# Patient Record
Sex: MALE | Race: BLACK OR AFRICAN AMERICAN | NOT HISPANIC OR LATINO | Employment: FULL TIME | URBAN - METROPOLITAN AREA
[De-identification: names, ages, dates, MRNs, and addresses within clinical notes are randomized per-mention and may not be internally consistent; named-entity substitution may affect disease eponyms.]

---

## 2023-10-11 PROBLEM — I47.10 SUPRAVENTRICULAR TACHYCARDIA (CMS-HCC): Status: ACTIVE | Noted: 2023-10-11

## 2023-10-11 PROBLEM — K64.8 BLEEDING INTERNAL HEMORRHOIDS: Status: ACTIVE | Noted: 2023-10-11

## 2023-10-11 PROBLEM — M54.16 LEFT LUMBAR RADICULOPATHY: Status: ACTIVE | Noted: 2023-10-11

## 2023-10-11 PROBLEM — L81.4 OTHER MELANIN HYPERPIGMENTATION: Status: ACTIVE | Noted: 2019-09-12

## 2023-10-11 PROBLEM — G47.30 SLEEP APNEA: Status: ACTIVE | Noted: 2023-10-11

## 2023-10-11 PROBLEM — E66.3 OVERWEIGHT: Status: ACTIVE | Noted: 2023-10-11

## 2023-10-11 PROBLEM — K64.9 HEMORRHOIDS: Status: ACTIVE | Noted: 2023-10-11

## 2023-10-11 PROBLEM — C92.10 CHRONIC MYELOID LEUKEMIA (MULTI): Status: ACTIVE | Noted: 2023-10-11

## 2023-10-11 PROBLEM — M51.26 HERNIATED NUCLEUS PULPOSUS, L4-5: Status: ACTIVE | Noted: 2023-10-11

## 2023-10-11 PROBLEM — B95.1 GROUP BETA STREP POSITIVE: Status: ACTIVE | Noted: 2023-10-11

## 2023-10-11 PROBLEM — K64.8 PROLAPSED INTERNAL HEMORRHOIDS: Status: ACTIVE | Noted: 2023-10-11

## 2023-10-11 PROBLEM — R94.31 ABNORMAL ECG: Status: ACTIVE | Noted: 2023-10-11

## 2023-10-11 PROBLEM — G47.33 OBSTRUCTIVE SLEEP APNEA OF ADULT: Status: ACTIVE | Noted: 2023-10-11

## 2023-10-11 PROBLEM — R00.2 PALPITATION: Status: ACTIVE | Noted: 2023-10-11

## 2023-10-11 PROBLEM — K21.9 ACID REFLUX: Status: ACTIVE | Noted: 2023-10-11

## 2023-10-11 PROBLEM — D22.5 MELANOCYTIC NEVI OF TRUNK: Status: ACTIVE | Noted: 2019-09-12

## 2023-10-11 PROBLEM — K62.5 RECTAL HEMORRHAGE: Status: ACTIVE | Noted: 2023-10-11

## 2023-10-11 PROBLEM — R06.02 SHORTNESS OF BREATH: Status: ACTIVE | Noted: 2023-10-11

## 2023-10-11 RX ORDER — ESOMEPRAZOLE MAGNESIUM 40 MG/1
1 CAPSULE, DELAYED RELEASE ORAL DAILY
COMMUNITY
Start: 2022-08-02 | End: 2023-10-23 | Stop reason: SDUPTHER

## 2023-10-11 RX ORDER — SODIUM PICOSULFATE, MAGNESIUM OXIDE, AND ANHYDROUS CITRIC ACID 10; 3.5; 12 MG/160ML; G/160ML; G/160ML
LIQUID ORAL
COMMUNITY
Start: 2022-06-17 | End: 2023-12-19 | Stop reason: WASHOUT

## 2023-10-11 RX ORDER — IMATINIB MESYLATE 400 MG/1
1 TABLET, FILM COATED ORAL DAILY
COMMUNITY

## 2023-10-12 ENCOUNTER — LAB (OUTPATIENT)
Dept: LAB | Facility: LAB | Age: 48
End: 2023-10-12
Payer: COMMERCIAL

## 2023-10-12 ENCOUNTER — OFFICE VISIT (OUTPATIENT)
Dept: PRIMARY CARE | Facility: CLINIC | Age: 48
End: 2023-10-12
Payer: COMMERCIAL

## 2023-10-12 VITALS
BODY MASS INDEX: 34.29 KG/M2 | RESPIRATION RATE: 18 BRPM | HEART RATE: 98 BPM | TEMPERATURE: 97.2 F | DIASTOLIC BLOOD PRESSURE: 82 MMHG | SYSTOLIC BLOOD PRESSURE: 132 MMHG | WEIGHT: 267.2 LBS | HEIGHT: 74 IN

## 2023-10-12 DIAGNOSIS — R00.2 PALPITATION: ICD-10-CM

## 2023-10-12 DIAGNOSIS — Z12.5 PROSTATE CANCER SCREENING: ICD-10-CM

## 2023-10-12 DIAGNOSIS — Z12.5 PROSTATE CANCER SCREENING: Primary | ICD-10-CM

## 2023-10-12 LAB
CHOLEST SERPL-MCNC: 123 MG/DL (ref 0–199)
CHOLESTEROL/HDL RATIO: 2.8
HDLC SERPL-MCNC: 43.5 MG/DL
LDLC SERPL CALC-MCNC: 66 MG/DL
NON HDL CHOLESTEROL: 80 MG/DL (ref 0–149)
PSA SERPL-MCNC: 1.23 NG/ML
TRIGL SERPL-MCNC: 70 MG/DL (ref 0–149)
TSH SERPL-ACNC: 1.15 MIU/L (ref 0.44–3.98)
VLDL: 14 MG/DL (ref 0–40)

## 2023-10-12 PROCEDURE — 80061 LIPID PANEL: CPT

## 2023-10-12 PROCEDURE — 90686 IIV4 VACC NO PRSV 0.5 ML IM: CPT | Performed by: INTERNAL MEDICINE

## 2023-10-12 PROCEDURE — 90677 PCV20 VACCINE IM: CPT | Performed by: INTERNAL MEDICINE

## 2023-10-12 PROCEDURE — 90472 IMMUNIZATION ADMIN EACH ADD: CPT | Performed by: INTERNAL MEDICINE

## 2023-10-12 PROCEDURE — 84443 ASSAY THYROID STIM HORMONE: CPT

## 2023-10-12 PROCEDURE — 90471 IMMUNIZATION ADMIN: CPT | Performed by: INTERNAL MEDICINE

## 2023-10-12 PROCEDURE — 99396 PREV VISIT EST AGE 40-64: CPT | Performed by: INTERNAL MEDICINE

## 2023-10-12 PROCEDURE — 36415 COLL VENOUS BLD VENIPUNCTURE: CPT

## 2023-10-12 PROCEDURE — 1036F TOBACCO NON-USER: CPT | Performed by: INTERNAL MEDICINE

## 2023-10-12 PROCEDURE — 84153 ASSAY OF PSA TOTAL: CPT

## 2023-10-12 RX ORDER — FERROUS GLUCONATE 325 MG
38 TABLET ORAL
COMMUNITY

## 2023-10-12 ASSESSMENT — ENCOUNTER SYMPTOMS
LOSS OF SENSATION IN FEET: 0
OCCASIONAL FEELINGS OF UNSTEADINESS: 0
DEPRESSION: 0

## 2023-10-12 ASSESSMENT — PATIENT HEALTH QUESTIONNAIRE - PHQ9
1. LITTLE INTEREST OR PLEASURE IN DOING THINGS: NOT AT ALL
2. FEELING DOWN, DEPRESSED OR HOPELESS: NOT AT ALL
SUM OF ALL RESPONSES TO PHQ9 QUESTIONS 1 AND 2: 0

## 2023-10-12 NOTE — PROGRESS NOTES
"Subjective   Patient ID: Duncan Ackerman is a 48 y.o. male who presents for Annual Exam.    HPI 48-year-old male here for follow-up annual visit.  Patient is doing well with his CML recently saw hematology is on Gleevac for 13 years    Review of Systems  HEENT negative  Cardiovascular 1 episode of chest tightness left-sided came and went well resting not exertional, also intermittent palpitations had these a year or 2 ago with a work-up apparently an echocardiogram showed left atrial enlargement.  Respiratory no shortness of breath on chronic CPAP for sleep apnea  GI chronic rectal bleeding from hemorrhoids had banding twice once he almost passed out interested in something more definitive   negative  Musculoskeletal negative    Objective   /82 (BP Location: Right arm, Patient Position: Sitting)   Pulse 98   Temp 36.2 °C (97.2 °F)   Resp 18   Ht 1.88 m (6' 2\")   Wt 121 kg (267 lb 3.2 oz)   BMI 34.31 kg/m²     Physical Exam  Constitutional:       Appearance: Normal appearance.   HENT:      Right Ear: Tympanic membrane normal.   Eyes:      Conjunctiva/sclera: Conjunctivae normal.   Neck:      Thyroid: No thyroid mass, thyromegaly or thyroid tenderness.      Vascular: No carotid bruit.   Cardiovascular:      Rate and Rhythm: Normal rate. Rhythm irregular.      Heart sounds: No murmur heard.     No friction rub. No gallop.   Pulmonary:      Effort: Pulmonary effort is normal.      Breath sounds: Normal breath sounds.   Abdominal:      General: Abdomen is flat. Bowel sounds are normal.      Palpations: Abdomen is soft. There is no mass.      Tenderness: There is no abdominal tenderness. There is no guarding.   Genitourinary:     Penis: Normal.       Testes: Normal.      Prostate: Normal.      Rectum: Normal. Guaiac result negative.      Comments: External hemorrhoids  Musculoskeletal:         General: Normal range of motion.      Cervical back: No rigidity.   Skin:     General: Skin is warm. "   Neurological:      General: No focal deficit present.      Mental Status: He is alert.   Psychiatric:         Mood and Affect: Mood normal.       EKG nsr  Assessment/Plan       #1  Cardiac I like to begin the work-up with a coronary artery calcium score and also a Zio patch TSH lipid panel.  He may need a stress test we will decide based on the results of these #2 acid reflux restart Nexium he had been off of it  CML stable Ari hematology no patient is due for immunizations we will do flu and Prevnar 20 today he should get COVID booster in the drugstore he will follow-up with me in 1 month and we will start the Shingrix series.  He would also be a candidate for RSV down the road.  3.  Hemorrhoids will refer to colorectal Dr. Toshia Reddy

## 2023-10-23 DIAGNOSIS — K21.9 GASTROESOPHAGEAL REFLUX DISEASE, UNSPECIFIED WHETHER ESOPHAGITIS PRESENT: Primary | ICD-10-CM

## 2023-10-24 RX ORDER — ESOMEPRAZOLE MAGNESIUM 40 MG/1
40 CAPSULE, DELAYED RELEASE ORAL DAILY
Qty: 90 CAPSULE | Refills: 3 | Status: SHIPPED | OUTPATIENT
Start: 2023-10-24

## 2023-10-26 ENCOUNTER — ANCILLARY PROCEDURE (OUTPATIENT)
Dept: RADIOLOGY | Facility: CLINIC | Age: 48
End: 2023-10-26
Payer: COMMERCIAL

## 2023-10-26 DIAGNOSIS — R00.2 PALPITATION: ICD-10-CM

## 2023-10-26 PROCEDURE — 75571 CT HRT W/O DYE W/CA TEST: CPT

## 2023-10-30 DIAGNOSIS — M54.16 LEFT LUMBAR RADICULOPATHY: Primary | ICD-10-CM

## 2023-10-30 DIAGNOSIS — R00.2 PALPITATION: ICD-10-CM

## 2023-10-30 NOTE — RESULT ENCOUNTER NOTE
Patient notified his CAT scan looks great he has a benign nodule that is been there before it is not changed nothing else needs to be done at this point.  He feels like he is having some pain in his kidneys in his back area I will do a urinalysis also we will set up a Holter monitor

## 2023-11-01 ENCOUNTER — LAB (OUTPATIENT)
Dept: LAB | Facility: LAB | Age: 48
End: 2023-11-01
Payer: COMMERCIAL

## 2023-11-01 DIAGNOSIS — M54.16 LEFT LUMBAR RADICULOPATHY: ICD-10-CM

## 2023-11-01 PROCEDURE — 81001 URINALYSIS AUTO W/SCOPE: CPT

## 2023-11-02 LAB
APPEARANCE UR: CLEAR
BILIRUB UR STRIP.AUTO-MCNC: NEGATIVE MG/DL
COLOR UR: YELLOW
GLUCOSE UR STRIP.AUTO-MCNC: NEGATIVE MG/DL
HOLD SPECIMEN: NORMAL
KETONES UR STRIP.AUTO-MCNC: NEGATIVE MG/DL
LEUKOCYTE ESTERASE UR QL STRIP.AUTO: NEGATIVE
MUCOUS THREADS #/AREA URNS AUTO: NORMAL /LPF
NITRITE UR QL STRIP.AUTO: NEGATIVE
PH UR STRIP.AUTO: 6 [PH]
PROT UR STRIP.AUTO-MCNC: ABNORMAL MG/DL
RBC # UR STRIP.AUTO: NEGATIVE /UL
RBC #/AREA URNS AUTO: NORMAL /HPF
SP GR UR STRIP.AUTO: 1.02
UROBILINOGEN UR STRIP.AUTO-MCNC: <2 MG/DL
WBC #/AREA URNS AUTO: NORMAL /HPF

## 2023-11-09 ENCOUNTER — HOSPITAL ENCOUNTER (OUTPATIENT)
Dept: CARDIOLOGY | Facility: CLINIC | Age: 48
Discharge: HOME | End: 2023-11-09
Payer: COMMERCIAL

## 2023-11-09 DIAGNOSIS — R00.2 PALPITATION: ICD-10-CM

## 2023-11-09 PROCEDURE — 93248 EXT ECG>7D<15D REV&INTERPJ: CPT | Performed by: INTERNAL MEDICINE

## 2023-11-09 PROCEDURE — 93246 EXT ECG>7D<15D RECORDING: CPT

## 2023-11-13 ENCOUNTER — APPOINTMENT (OUTPATIENT)
Dept: PRIMARY CARE | Facility: CLINIC | Age: 48
End: 2023-11-13
Payer: COMMERCIAL

## 2023-12-18 ENCOUNTER — OFFICE VISIT (OUTPATIENT)
Dept: SURGERY | Facility: CLINIC | Age: 48
End: 2023-12-18
Payer: COMMERCIAL

## 2023-12-18 ENCOUNTER — APPOINTMENT (OUTPATIENT)
Dept: SURGERY | Facility: CLINIC | Age: 48
End: 2023-12-18
Payer: COMMERCIAL

## 2023-12-18 VITALS
WEIGHT: 263.2 LBS | SYSTOLIC BLOOD PRESSURE: 158 MMHG | HEIGHT: 74 IN | HEART RATE: 81 BPM | TEMPERATURE: 97.7 F | DIASTOLIC BLOOD PRESSURE: 54 MMHG | BODY MASS INDEX: 33.78 KG/M2

## 2023-12-18 DIAGNOSIS — K64.8 PROLAPSED INTERNAL HEMORRHOIDS: Primary | ICD-10-CM

## 2023-12-18 PROCEDURE — 46600 DIAGNOSTIC ANOSCOPY SPX: CPT | Performed by: NURSE PRACTITIONER

## 2023-12-18 PROCEDURE — 99213 OFFICE O/P EST LOW 20 MIN: CPT | Performed by: NURSE PRACTITIONER

## 2023-12-18 PROCEDURE — 1036F TOBACCO NON-USER: CPT | Performed by: NURSE PRACTITIONER

## 2023-12-18 ASSESSMENT — PAIN SCALES - GENERAL: PAINLEVEL: 0-NO PAIN

## 2023-12-18 NOTE — PROGRESS NOTES
Subjective   Patient ID: Duncan Ackerman is a 48 y.o. male who presents for Hemorrhoids.    HPI  He has had the hemorrhoids for years and will have rectal bleeding and pain that comes and goes.  He had a rubber band ligation in the past and that helped only for a little bit.  2 weeks ago he had more rectal bleeding.  He will have prolapsing internal hemorrhoids that will prolapse out after the BM and it can take hours for them to go back in.  He will have anal pain that can last for 15-20 minutes after the BM's.      He will have 1 soft BM every day with no straining.  He will sit over 5-10 minutes on the toilet to have a BM.  He is not taking any fiber supplements or Colace.    No hx of any perianal surgeries.  He has had a rubber band ligation in 2020 and last year.    Colonoscopy a few years ago    Review of Systems   All other systems reviewed and are negative.      Objective   Physical Exam  Constitutional:       Appearance: Normal appearance.   HENT:      Head: Normocephalic and atraumatic.   Pulmonary:      Effort: Pulmonary effort is normal.   Musculoskeletal:         General: Normal range of motion.   Skin:     General: Skin is warm and dry.   Neurological:      General: No focal deficit present.      Mental Status: He is alert and oriented to person, place, and time.   Psychiatric:         Mood and Affect: Mood normal.         Behavior: Behavior normal.       Anoscopy    Date/Time: 12/19/2023 3:38 PM    Performed by: ANNA Rubio  Authorized by: ANNA Rubio    Consent:     Consent obtained:  Verbal    Consent given by:  Patient    Risks, benefits, and alternatives were discussed: yes    Universal protocol:     Procedure explained and questions answered to patient or proxy's satisfaction: yes      Patient identity confirmed:  Verbally with patient  Post-procedure details:     Procedure completion:  Tolerated  Comments:      No external hemorrhoid.  Goo tone on GAVIN.  On  anoscopy, he has moderate to large prolapsing and irritated internal hemorrhoids.  No active bleeding    Assessment/Plan   Duncan has moderate to large prolapsing inflamed internal hemorrhoids.  We talked about surgery for the hemorrhoids and he would like to proceed.  He will call when he would like to have this done and I will schedule him to have the hemorrhoidectomy with one of our colorectal surgeons.      Thi Larkin, STU-DONNIE 12/18/23 11:13 AM

## 2024-01-24 ENCOUNTER — LAB (OUTPATIENT)
Dept: LAB | Facility: LAB | Age: 49
End: 2024-01-24
Payer: COMMERCIAL

## 2024-01-24 ENCOUNTER — OFFICE VISIT (OUTPATIENT)
Dept: PRIMARY CARE | Facility: CLINIC | Age: 49
End: 2024-01-24
Payer: COMMERCIAL

## 2024-01-24 VITALS
RESPIRATION RATE: 18 BRPM | HEART RATE: 80 BPM | BODY MASS INDEX: 34.01 KG/M2 | HEIGHT: 74 IN | SYSTOLIC BLOOD PRESSURE: 132 MMHG | DIASTOLIC BLOOD PRESSURE: 82 MMHG | OXYGEN SATURATION: 98 % | TEMPERATURE: 97.7 F | WEIGHT: 265 LBS

## 2024-01-24 DIAGNOSIS — R73.9 HYPERGLYCEMIA: Primary | ICD-10-CM

## 2024-01-24 DIAGNOSIS — N52.9 ERECTILE DYSFUNCTION, UNSPECIFIED ERECTILE DYSFUNCTION TYPE: ICD-10-CM

## 2024-01-24 DIAGNOSIS — R00.2 PALPITATION: ICD-10-CM

## 2024-01-24 DIAGNOSIS — R73.9 HYPERGLYCEMIA: ICD-10-CM

## 2024-01-24 LAB
EST. AVERAGE GLUCOSE BLD GHB EST-MCNC: 105 MG/DL
HBA1C MFR BLD: 5.3 %
MAGNESIUM SERPL-MCNC: 2.07 MG/DL (ref 1.6–2.4)

## 2024-01-24 PROCEDURE — 83735 ASSAY OF MAGNESIUM: CPT

## 2024-01-24 PROCEDURE — 84402 ASSAY OF FREE TESTOSTERONE: CPT

## 2024-01-24 PROCEDURE — 1036F TOBACCO NON-USER: CPT | Performed by: INTERNAL MEDICINE

## 2024-01-24 PROCEDURE — 99214 OFFICE O/P EST MOD 30 MIN: CPT | Performed by: INTERNAL MEDICINE

## 2024-01-24 PROCEDURE — 36415 COLL VENOUS BLD VENIPUNCTURE: CPT

## 2024-01-24 PROCEDURE — 83036 HEMOGLOBIN GLYCOSYLATED A1C: CPT

## 2024-01-24 RX ORDER — METOPROLOL SUCCINATE 25 MG/1
25 TABLET, EXTENDED RELEASE ORAL DAILY
Qty: 30 TABLET | Refills: 5 | Status: SHIPPED | OUTPATIENT
Start: 2024-01-24 | End: 2024-07-22

## 2024-01-24 ASSESSMENT — PATIENT HEALTH QUESTIONNAIRE - PHQ9
1. LITTLE INTEREST OR PLEASURE IN DOING THINGS: NOT AT ALL
SUM OF ALL RESPONSES TO PHQ9 QUESTIONS 1 AND 2: 0
2. FEELING DOWN, DEPRESSED OR HOPELESS: NOT AT ALL

## 2024-01-24 NOTE — PROGRESS NOTES
"Subjective   Patient ID: Duncan Ackerman is a 48 y.o. male who presents for Follow-up.    HPI patient is here for follow-up palpitations he is ambulatory monitor showed short runs of V. tach longer runs of SVT multiple PVCs and premature atrial contractions.  These do correlate with his block.  His coronary artery calcium score 0 his total cholesterol 123    Review of Systems  Cardiovascular still has palpitations come and go bothersome would like some help  Respiratory negative   he has ED but mail-order Cialis tried it helps him 20 mg and 60 mg dose.  Endo concern glucose a little bit high blood work constantly he will need an A1c this is usually done nonfasting    Objective   /82 (BP Location: Right arm, Patient Position: Sitting)   Pulse 80   Temp 36.5 °C (97.7 °F)   Resp 18   Ht 1.88 m (6' 2\")   Wt 120 kg (265 lb)   SpO2 98%   BMI 34.02 kg/m²     Physical Exam  Neck:      Vascular: No carotid bruit.   Cardiovascular:      Rate and Rhythm: Regular rhythm.      Heart sounds: Normal heart sounds.   Pulmonary:      Breath sounds: Normal breath sounds.   Abdominal:      General: Abdomen is flat. Bowel sounds are normal.      Palpations: Abdomen is soft.   Musculoskeletal:      Cervical back: No tenderness.   Lymphadenopathy:      Cervical: No cervical adenopathy.       Seen with his wife      Assessment/Plan   Diagnoses and all orders for this visit:  Hyperglycemia  -     Hemoglobin A1C; Future  Palpitation  We went over what I see on his ambulatory monitor in fact I made a copy and gave it to them.  Because he is on Nexium I will check a magnesium to make sure this is not low contributing I will add low-dose metoprolol XL 25 mg once daily his blood pressure is a little bit high today.  Follow-up 1 month  -     Magnesium; Future  -     metoprolol succinate XL (Toprol-XL) 25 mg 24 hr tablet; Take 1 tablet (25 mg) by mouth once daily. Do not crush or chew.  Erectile dysfunction, unspecified erectile " dysfunction type  -     Testosterone, total and free; Future  Okay to continue with his mail order testosterone which I believe is sildenafil we will do a workup

## 2024-01-28 LAB
TESTOSTERONE FREE (CHAN): 100.6 PG/ML (ref 35–155)
TESTOSTERONE,TOTAL,LC-MS/MS: 470 NG/DL (ref 250–1100)

## 2024-02-23 ENCOUNTER — APPOINTMENT (OUTPATIENT)
Dept: PRIMARY CARE | Facility: CLINIC | Age: 49
End: 2024-02-23
Payer: COMMERCIAL

## 2024-02-29 ENCOUNTER — OFFICE VISIT (OUTPATIENT)
Dept: PRIMARY CARE | Facility: CLINIC | Age: 49
End: 2024-02-29
Payer: COMMERCIAL

## 2024-02-29 ENCOUNTER — LAB (OUTPATIENT)
Dept: LAB | Facility: LAB | Age: 49
End: 2024-02-29
Payer: COMMERCIAL

## 2024-02-29 VITALS
OXYGEN SATURATION: 98 % | RESPIRATION RATE: 18 BRPM | BODY MASS INDEX: 33.88 KG/M2 | TEMPERATURE: 97.2 F | SYSTOLIC BLOOD PRESSURE: 124 MMHG | HEART RATE: 80 BPM | HEIGHT: 74 IN | WEIGHT: 264 LBS | DIASTOLIC BLOOD PRESSURE: 80 MMHG

## 2024-02-29 DIAGNOSIS — R30.0 DYSURIA: ICD-10-CM

## 2024-02-29 DIAGNOSIS — K64.1 GRADE II HEMORRHOIDS: ICD-10-CM

## 2024-02-29 DIAGNOSIS — K21.9 GASTROESOPHAGEAL REFLUX DISEASE WITHOUT ESOPHAGITIS: ICD-10-CM

## 2024-02-29 DIAGNOSIS — R00.2 PALPITATION: ICD-10-CM

## 2024-02-29 DIAGNOSIS — K64.9 BLEEDING HEMORRHOIDS: Primary | ICD-10-CM

## 2024-02-29 PROBLEM — N52.9 ERECTILE DYSFUNCTION: Status: ACTIVE | Noted: 2024-02-29

## 2024-02-29 PROBLEM — R05.8 PRODUCTIVE COUGH: Status: ACTIVE | Noted: 2024-02-29

## 2024-02-29 PROBLEM — C92.11: Status: ACTIVE | Noted: 2021-12-03

## 2024-02-29 PROBLEM — R58 BLEEDING: Status: ACTIVE | Noted: 2024-02-29

## 2024-02-29 PROBLEM — M51.26 HERNIATION OF LUMBAR INTERVERTEBRAL DISC WITHOUT MYELOPATHY: Status: ACTIVE | Noted: 2023-10-11

## 2024-02-29 PROBLEM — M54.16 LUMBAR RADICULOPATHY: Status: ACTIVE | Noted: 2023-10-11

## 2024-02-29 PROBLEM — D22.5 MELANOCYTIC NEVUS OF TRUNK: Status: ACTIVE | Noted: 2019-09-12

## 2024-02-29 PROBLEM — R73.9 HYPERGLYCEMIA: Status: ACTIVE | Noted: 2024-02-29

## 2024-02-29 PROBLEM — G47.33 OBSTRUCTIVE SLEEP APNEA SYNDROME IN ADULT: Status: ACTIVE | Noted: 2023-10-11

## 2024-02-29 PROBLEM — R82.998 DARK URINE: Status: ACTIVE | Noted: 2024-02-29

## 2024-02-29 PROBLEM — J18.9 ATYPICAL PNEUMONIA: Status: ACTIVE | Noted: 2024-02-29

## 2024-02-29 PROBLEM — R09.81 NASAL CONGESTION: Status: ACTIVE | Noted: 2024-02-29

## 2024-02-29 PROBLEM — L81.9 DISORDER OF PIGMENTATION: Status: ACTIVE | Noted: 2019-09-12

## 2024-02-29 PROBLEM — J02.9 SORE THROAT: Status: ACTIVE | Noted: 2024-02-29

## 2024-02-29 PROCEDURE — 87086 URINE CULTURE/COLONY COUNT: CPT

## 2024-02-29 PROCEDURE — 99214 OFFICE O/P EST MOD 30 MIN: CPT | Performed by: INTERNAL MEDICINE

## 2024-02-29 PROCEDURE — 1036F TOBACCO NON-USER: CPT | Performed by: INTERNAL MEDICINE

## 2024-02-29 RX ORDER — HYDROCORTISONE ACETATE 25 MG/1
25 SUPPOSITORY RECTAL NIGHTLY PRN
Qty: 30 SUPPOSITORY | Refills: 1 | Status: SHIPPED | OUTPATIENT
Start: 2024-02-29 | End: 2024-03-30

## 2024-02-29 RX ORDER — HYDROCORTISONE 25 MG/G
CREAM TOPICAL 4 TIMES DAILY PRN
Qty: 30 G | Refills: 0 | Status: SHIPPED | OUTPATIENT
Start: 2024-02-29 | End: 2025-02-28

## 2024-02-29 NOTE — PROGRESS NOTES
Subjective   Patient ID: Duncan Ackerman is a 48 y.o. male who presents for Annual Exam.    HPI 47 yo aam for checkup  High blood pressure now on metoprolol acid reflux now on Nexium and internal/external hemorrhoids    Review of Systems  Patient's palpitations are better still occasional  Acid reflux better  Hemorrhoids better patient is following instructions from colorectal not sitting for long on the toilet he is not using anything topically occasionally has pain in the rectum occasionally the hemorrhoids are out  Objective   There were no vitals taken for this visit.    Physical Exam  Cardiovascular:      Rate and Rhythm: Regular rhythm.   Abdominal:      General: There is no distension.      Tenderness: There is no abdominal tenderness.   Genitourinary:     Comments: Rectum very minimal external hemorrhoids involves nontender  Neurological:      Mental Status: He is alert.         Assessment/Plan   Diagnoses and all orders for this visit:  Bleeding hemorrhoids  Continue with more fiber try to avoid nuts and corn popcorn things that come up undigested and irritate that area continue to not sit on the toilet for more than 5 minutes.  Try to wash or take a shower after bowel movement.  Use hydrocortisone rectal suppository if you are having a lot of rectal pain during bedtime.  During the day you can use the Anusol hydrocortisone cream but again use these very sparingly and only when it is hurting and you can get them better otherwise  -     hydrocortisone (Anusol-HC) 25 mg suppository; Insert 1 suppository (25 mg) into the rectum as needed at bedtime for hemorrhoids.  -     hydrocortisone (Anusol-HC) 2.5 % rectal cream; Insert into the rectum 4 times a day as needed for hemorrhoids (rectal discomfort). Apply to affected areas  Dysuria  -     Urine Culture; Future  Hypertension good control continue with metoprolol XL 25 mg daily  Palpitations better control continue with metoprolol  Acid reflux continue with  Nexium  Follow-up 4 months

## 2024-03-01 LAB — BACTERIA UR CULT: NO GROWTH

## 2024-06-27 ENCOUNTER — APPOINTMENT (OUTPATIENT)
Dept: PRIMARY CARE | Facility: CLINIC | Age: 49
End: 2024-06-27
Payer: COMMERCIAL

## 2024-06-27 DIAGNOSIS — K64.8 BLEEDING INTERNAL HEMORRHOIDS: ICD-10-CM

## 2024-06-27 DIAGNOSIS — C92.10 CHRONIC MYELOID LEUKEMIA (MULTI): ICD-10-CM

## 2024-06-27 DIAGNOSIS — R00.2 PALPITATION: ICD-10-CM

## 2024-06-27 DIAGNOSIS — C92.11: ICD-10-CM

## 2024-06-27 DIAGNOSIS — L30.9 ECZEMA, UNSPECIFIED TYPE: Primary | ICD-10-CM

## 2024-06-27 DIAGNOSIS — K21.9 GASTROESOPHAGEAL REFLUX DISEASE WITHOUT ESOPHAGITIS: ICD-10-CM

## 2024-06-27 DIAGNOSIS — K64.8 PROLAPSED INTERNAL HEMORRHOIDS: ICD-10-CM

## 2024-06-27 PROCEDURE — 99214 OFFICE O/P EST MOD 30 MIN: CPT | Performed by: INTERNAL MEDICINE

## 2024-06-27 RX ORDER — CLOBETASOL PROPIONATE 0.5 MG/G
CREAM TOPICAL 2 TIMES DAILY
Qty: 30 G | Refills: 1 | Status: SHIPPED | OUTPATIENT
Start: 2024-06-27 | End: 2025-02-22

## 2024-06-27 NOTE — PROGRESS NOTES
Subjective   Patient ID: Duncan Ackerman is a 49 y.o. male who presents for No chief complaint on file..    HPI check up and follow-up palpitations better on metoprolol XL 25 mg daily, CML, bleeding external hemorrhoids    Review of Systems  Cardiovascular palpitations better  GI patient is decided to have surgery on hemorrhoids he has a follow-up with the physician has been in contact with them  CML seeing his hematologist to transfer from [facility]  Objective   There were no vitals taken for this visit.    Physical Exam blood pressure 120/75 heart rate 65 and regular respirations 16  Lungs clear to auscultation  Heart regular rate and rhythm without gallop rub or murmur  Abdomen negative  Extremities no edema  You have some eczema just inside your ears and probably on the left wrist although the left wrist may be contact dermatitis from your watchband    Assessment/Plan   Diagnoses and all orders for this visit:  Eczema, unspecified type  -     clobetasol (Temovate) 0.05 % cream; Apply topically 2 times a day.  Gastroesophageal reflux disease without esophagitis good control continue with your antacid  Chronic myeloid leukemia (Multi)  I reviewed your blood work good control  Palpitation  This is under good control with metoprolol XL 25 mg once daily BCR/ABL1-positive chronic myeloid leukemia (CML) in remission (Multi)  Bleeding internal hemorrhoids  You are working with surgical team to have hemorrhoidectomy call me after you meet with the doctor  Prolapsed internal hemorrhoids  Follow-up 6 months

## 2024-07-16 ENCOUNTER — APPOINTMENT (OUTPATIENT)
Dept: SURGERY | Facility: CLINIC | Age: 49
End: 2024-07-16
Payer: COMMERCIAL

## 2024-07-16 NOTE — PROGRESS NOTES
HPI    Duncan Ackerman is a 49 y.o. male with a hx of chronic myeloid leukemia (in remission), who was seen by DONNIE Ness for hemorrhoids. He was seen in office on 12/18/23 and found to have large internal prolapsing inflamed hemorrhoids. He presents today to discuss possible hemorrhoidectomy.      He is accompanied by his wife today. He reports he has had the hemorrhoids for years. He has attempted bandings. The hemorrhoids do prolapse. He can have some drainage from rectum. He has had heavy bleeding with hemorrhoids, which have caused his iron levels to decrease. Most recently when Thi attempted to band he felt like he was going to pass out. He has a bm daily. He does have rectal pain associated with hemorrhoids. He has episodes of urgency that causes him to go to the bathroom multiple times a day without a bm, was his way of trying not to strain. He drives often which irritates the hemorrhoids. He feels the hemorrhoids are partially prolapsed currently. He does not feel like he completely empties bowels after a bm. He denies hematochezia or melena. He denies itching. No fecal incontinence.  Normal appetite. He denies abdominal pain. He denies dysuria or hematuria. He is currently taking Imatinib for CML. He denies any incontinence. His CML is currently being managed by Lan Serrano MD. Office number: [phone].     Flexible sigmoidoscopy 7/26/22 ([facility]): - Hemorrhoids.  - Non-thrombosed internal hemorrhoids and internal hemorrhoids that prolapse with straining, but require manual replacement into the anal canal (Grade III) found on perineal exam.  - Bleeding internal hemorrhoids. Banded.  -No specimens collected.     Colonoscopy 6/21/22 ([facility]): - One 2 mm polyp in the descending colon, removed with a cold biopsy forceps. Resected and retrieved.  Path fragments of tubular adenoma.   - One 5 mm polyp in the sigmoid colon, removed with a cold snare. Resected and retrieved. Path fragments of  tubular adenoma.   - Non-bleeding internal hemorrhoids. Banded.   - The examination was otherwise normal.   - Repeat in 5 years for surveillance.     Non-smoker/No ETOH/No Illicit drug use  PMH: GERD, Chronic myeloid leukemia-in remission,   PSH: Bandings,   No family history of CRC or IBD  Employment: [employer]    Past Medical History:   Diagnosis Date    Abnormal EKG     Acid reflux     Back pain     Cervical radicular pain     H/O chronic myeloid leukemia     H/O hemorrhoids     Herniated nucleus pulposus, L4-5     Left lumbar radiculopathy     Palpitations     Prolapsed internal hemorrhoids     Rectal hemorrhage     Sleep apnea        Past Surgical History:   Procedure Laterality Date    OTHER SURGICAL HISTORY  12/23/2019    Hemorrhoid rubber band ligation    OTHER SURGICAL HISTORY  09/25/2020    Foot surgery       No Known Allergies    Review of Systems   Constitutional:  Negative for activity change, appetite change, chills, diaphoresis, fatigue, fever and unexpected weight change.   Respiratory:  Negative for cough, chest tightness and shortness of breath.    Cardiovascular:  Negative for chest pain, palpitations and leg swelling.   Gastrointestinal:  Positive for anal bleeding and rectal pain. Negative for abdominal distention, abdominal pain, blood in stool, constipation, diarrhea, nausea and vomiting.   Genitourinary:  Negative for difficulty urinating, dysuria and hematuria.   Neurological:  Negative for dizziness, weakness and light-headedness.   All other systems reviewed and are negative.      Current Outpatient Medications on File Prior to Visit   Medication Sig Dispense Refill    clobetasol (Temovate) 0.05 % cream Apply topically 2 times a day. 30 g 1    esomeprazole (NexIUM) 40 mg DR capsule Take 1 capsule (40 mg) by mouth once daily. 90 capsule 3    ferrous gluconate (Fergon) 324 (38 Fe) mg tablet Take 1 tablet (38 mg of iron) by mouth once daily with breakfast.      hydrocortisone  (Anusol-HC) 2.5 % rectal cream Insert into the rectum 4 times a day as needed for hemorrhoids (rectal discomfort). Apply to affected areas 30 g 0    imatinib (Gleevec) 400 mg tablet Take 1 tablet (400 mg total) by mouth once daily.       No current facility-administered medications on file prior to visit.       Physical Exam  Vitals reviewed. Exam conducted with a chaperone present.   Constitutional:       Appearance: Normal appearance.   HENT:      Head: Normocephalic.   Eyes:      Pupils: Pupils are equal, round, and reactive to light.   Cardiovascular:      Rate and Rhythm: Normal rate and regular rhythm.      Pulses: Normal pulses.      Heart sounds: Normal heart sounds. No murmur heard.  Pulmonary:      Effort: Pulmonary effort is normal. No respiratory distress.      Breath sounds: Normal breath sounds. No stridor. No wheezing, rhonchi or rales.   Chest:      Chest wall: No tenderness.   Abdominal:      General: There is no distension.      Palpations: Abdomen is soft. There is no mass.      Tenderness: There is no abdominal tenderness.      Hernia: No hernia is present.   Musculoskeletal:         General: No swelling or deformity. Normal range of motion.      Cervical back: Normal range of motion.      Right lower leg: No edema.      Left lower leg: No edema.   Skin:     General: Skin is warm and dry.      Capillary Refill: Capillary refill takes less than 2 seconds.   Neurological:      General: No focal deficit present.      Mental Status: He is alert and oriented to person, place, and time. Mental status is at baseline.   Psychiatric:         Mood and Affect: Mood normal.         Behavior: Behavior normal.         Thought Content: Thought content normal.         Judgment: Judgment normal.     Anoscopy    Date/Time: 7/24/2024 10:29 AM    Performed by: Duncan Carter MD  Authorized by: Duncan Carter MD    Consent:     Consent obtained:  Verbal    Consent given by:  Patient    Risks, benefits, and  alternatives were discussed: yes    Procedure details:     Internal hemorrhoids: yes    Comments:      Large non-bleeding, non-thrombosed partially prolapsed left lateral internal hemorrhoid.  No palpable masses or bleeding on GAVIN.  Intact sphincter tone.  Anoscopy with large left lateral internal hemorrhoid.  No other large internal hemorrhoids visualized within limitation of large left lateral hemorrhoid component.  Prolapsed internal hemorrhoid easily reduced manually.        Assessment and Plan:   #Prolapsing left lateral internal hemorrhoid  #CML on chronic imatinib therapy  -  Excisional hemorrhoidectomy 9/27/2024 after patient has returned from vacation/cruise with wife  -  Will touch base with his oncologist regarding plan for imatinib therapy perioperatively  -  ZULEYMA Carter MD   7/24/2024  10:31 AM

## 2024-07-24 ENCOUNTER — APPOINTMENT (OUTPATIENT)
Dept: SURGERY | Facility: CLINIC | Age: 49
End: 2024-07-24
Payer: COMMERCIAL

## 2024-07-24 VITALS
BODY MASS INDEX: 33.42 KG/M2 | SYSTOLIC BLOOD PRESSURE: 153 MMHG | HEART RATE: 56 BPM | TEMPERATURE: 97.3 F | WEIGHT: 260.4 LBS | HEIGHT: 74 IN | DIASTOLIC BLOOD PRESSURE: 89 MMHG

## 2024-07-24 DIAGNOSIS — K64.8 INTERNAL HEMORRHOIDS: ICD-10-CM

## 2024-07-24 DIAGNOSIS — C92.10 CHRONIC MYELOID LEUKEMIA (MULTI): Primary | ICD-10-CM

## 2024-07-24 PROCEDURE — 3008F BODY MASS INDEX DOCD: CPT | Performed by: STUDENT IN AN ORGANIZED HEALTH CARE EDUCATION/TRAINING PROGRAM

## 2024-07-24 PROCEDURE — 46600 DIAGNOSTIC ANOSCOPY SPX: CPT | Performed by: STUDENT IN AN ORGANIZED HEALTH CARE EDUCATION/TRAINING PROGRAM

## 2024-07-24 PROCEDURE — 99214 OFFICE O/P EST MOD 30 MIN: CPT | Performed by: STUDENT IN AN ORGANIZED HEALTH CARE EDUCATION/TRAINING PROGRAM

## 2024-07-24 ASSESSMENT — ENCOUNTER SYMPTOMS
FATIGUE: 0
DIARRHEA: 0
ACTIVITY CHANGE: 0
APPETITE CHANGE: 0
RECTAL PAIN: 1
DIFFICULTY URINATING: 0
UNEXPECTED WEIGHT CHANGE: 0
DYSURIA: 0
PALPITATIONS: 0
VOMITING: 0
ABDOMINAL PAIN: 0
CONSTIPATION: 0
BLOOD IN STOOL: 0
DIAPHORESIS: 0
ANAL BLEEDING: 1
CHILLS: 0
FEVER: 0
COUGH: 0
HEMATURIA: 0
ABDOMINAL DISTENTION: 0
SHORTNESS OF BREATH: 0
DIZZINESS: 0
WEAKNESS: 0
LIGHT-HEADEDNESS: 0
NAUSEA: 0
CHEST TIGHTNESS: 0

## 2024-07-30 ENCOUNTER — APPOINTMENT (OUTPATIENT)
Dept: SURGERY | Facility: CLINIC | Age: 49
End: 2024-07-30
Payer: COMMERCIAL

## 2024-09-17 ASSESSMENT — DUKE ACTIVITY SCORE INDEX (DASI)
CAN YOU DO YARD WORK LIKE RAKING LEAVES, WEEDING OR PUSHING A MOWER: YES
CAN YOU TAKE CARE OF YOURSELF (EAT, DRESS, BATHE, OR USE TOILET): YES
CAN YOU DO HEAVY WORK AROUND THE HOUSE LIKE SCRUBBING FLOORS OR LIFTING AND MOVING HEAVY FURNITURE: YES
CAN YOU HAVE SEXUAL RELATIONS: YES
TOTAL_SCORE: 58.2
CAN YOU DO MODERATE WORK AROUND THE HOUSE LIKE VACUUMING, SWEEPING FLOORS OR CARRYING GROCERIES: YES
CAN YOU DO LIGHT WORK AROUND THE HOUSE LIKE DUSTING OR WASHING DISHES: YES
CAN YOU PARTICIPATE IN MODERATE RECREATIONAL ACTIVITIES LIKE GOLF, BOWLING, DANCING, DOUBLES TENNIS OR THROWING A BASEBALL OR FOOTBALL: YES
CAN YOU CLIMB A FLIGHT OF STAIRS OR WALK UP A HILL: YES
CAN YOU WALK A BLOCK OR TWO ON LEVEL GROUND: YES
CAN YOU WALK INDOORS, SUCH AS AROUND YOUR HOUSE: YES
CAN YOU PARTICIPATE IN STRENOUS SPORTS LIKE SWIMMING, SINGLES TENNIS, FOOTBALL, BASKETBALL, OR SKIING: YES
CAN YOU RUN A SHORT DISTANCE: YES
DASI METS SCORE: 9.9

## 2024-09-17 ASSESSMENT — LIFESTYLE VARIABLES: SMOKING_STATUS: NONSMOKER

## 2024-09-17 ASSESSMENT — ACTIVITIES OF DAILY LIVING (ADL): ADL_SCORE: 0

## 2024-09-18 ENCOUNTER — PRE-ADMISSION TESTING (OUTPATIENT)
Dept: PREADMISSION TESTING | Facility: HOSPITAL | Age: 49
End: 2024-09-18
Payer: COMMERCIAL

## 2024-09-18 VITALS
HEIGHT: 74 IN | TEMPERATURE: 97.5 F | HEART RATE: 53 BPM | WEIGHT: 262.79 LBS | DIASTOLIC BLOOD PRESSURE: 78 MMHG | OXYGEN SATURATION: 97 % | RESPIRATION RATE: 14 BRPM | SYSTOLIC BLOOD PRESSURE: 133 MMHG | BODY MASS INDEX: 33.73 KG/M2

## 2024-09-18 DIAGNOSIS — K64.8 INTERNAL HEMORRHOIDS: ICD-10-CM

## 2024-09-18 DIAGNOSIS — Z01.818 PREOP TESTING: Primary | ICD-10-CM

## 2024-09-18 PROCEDURE — 99202 OFFICE O/P NEW SF 15 MIN: CPT | Performed by: NURSE PRACTITIONER

## 2024-09-18 ASSESSMENT — PAIN - FUNCTIONAL ASSESSMENT: PAIN_FUNCTIONAL_ASSESSMENT: 0-10

## 2024-09-18 ASSESSMENT — PAIN SCALES - GENERAL: PAINLEVEL_OUTOF10: 0 - NO PAIN

## 2024-09-18 NOTE — PREPROCEDURE INSTRUCTIONS
Medication List            Accurate as of September 18, 2024 11:17 AM. Always use your most recent med list.                clobetasol 0.05 % cream  Commonly known as: Temovate  Apply topically 2 times a day.  Medication Adjustments for Surgery: Take/Use as prescribed     esomeprazole 40 mg DR capsule  Commonly known as: NexIUM  Take 1 capsule (40 mg) by mouth once daily.  Medication Adjustments for Surgery: Take/Use as prescribed     ferrous gluconate 324 (38 Fe) mg tablet  Commonly known as: Fergon  Additional Medication Adjustments for Surgery: Other (Comment)  Notes to patient: Collaborate with physician     Gleevec 400 mg tablet  Generic drug: imatinib  Additional Medication Adjustments for Surgery: Other (Comment)  Notes to patient: Collaborate with physician     hydrocortisone 2.5 % rectal cream  Commonly known as: Anusol-HC  Insert into the rectum 4 times a day as needed for hemorrhoids (rectal discomfort). Apply to affected areas  Medication Adjustments for Surgery: Take/Use as prescribed                                  PRE-OPERATIVE INSTRUCTIONS    You will receive notification one business day prior to your procedure to confirm your arrival time. It is important that you answer your phone and/or check your messages during this time. If you do not hear from the surgery center by 5 pm. the day before your procedure, please call [phone].     Please enter the building through the Outpatient entrance and take the elevator off the lobby to the 2nd floor then check in at the Outpatient Surgery desk on the 2nd floor.    INSTRUCTIONS:  Talk to your surgeon for instructions if you should stop your aspirin, blood thinner, or diabetes medicines.  DO NOT take any multivitamins or over the counter supplements for 7-10 days before surgery.  If not being admitted, you must have an adult immediately available to drive you home after surgery. We also highly recommend you have someone stay with you for the entire  day and night of your surgery.  For children having surgery, a parent or legal guardian must accompany them to the surgery center. If this is not possible, please call [phone] to make additional arrangements.  For adults who are unable to consent or make medical decisions for themselves, a legal guardian or Power of  must accompany them to the surgery center. If this is not possible, please call [phone] to make additional arrangements.  Wear comfortable, loose fitting clothing.  All jewelry and piercings must be removed. If you are unable to remove an item or have a dermal piercing, please be sure to tell the nurse when you arrive for surgery.  Nail polish and make-up must be removed.  Avoid smoking or consuming alcohol for 24 hours before surgery.  To help prevent infection, please take a shower/bath and wash your hair the night before and/or morning of surgery (or follow other specific bathing instructions provided).    Preoperative Fasting Guidelines    Why must I stop eating and drinking near surgery time?  With sedation, food or liquid in your stomach can enter your lungs causing serious complications  Increases nausea and vomiting    When do I need to stop eating and drinking before my surgery?  Do not eat any solid food after midnight the night before your surgery/procedure unless otherwise instructed by your surgeon.   You may have up to 13.5 ounces of clear liquid until TWO hours before your instructed arrival time to the hospital.  This includes water, black tea/coffee, (no milk or cream) apple juice, and electrolyte drinks (Gatorade).   You may chew gum until TWO hours before your surgery/procedure      If applicable, notify your surgeons office immediately of any new skin changes that occur to the surgical limb.      If you have any questions or concerns, please call Pre-Admission Testing at [phone].             Home Preoperative Antibacterial Shower with Chlorhexidine gluconate  (CHG)     What is a home preoperative antibacterial shower?  This shower is a way of cleaning the skin with a germ killing solution before surgery. The solution contains chlorhexidine gluconate, commonly known as CHG. CHG is a skin cleanser with germ killing ability. Let your doctor know if you are allergic to chlorhexidine.    Why do I need to take a preoperative antibacterial shower?  Skin is not sterile. It is best to try to make your skin as free of germs as possible before surgery. Proper cleansing with a germ killing soap before surgery can lower the number of germs on your skin. This helps to reduce the risk of infection at the surgical site. Following the instructions listed below will help you prepare your skin for surgery.    How do I use the solution?  Begin using your CHG soap the night before and again the morning of your procedure.   Do not shave the day before or day of surgery.  Remove all jewelry until after surgery. Take off rings and take out all body-piercing jewelry.  Wash your face and hair with normal soap and shampoo before you use the CHG soap.  Apply the CHG solution to a clean wet washcloth. Move away from the water to avoid premature rinsing of the CHG soap as you are applying. Firmly lather your entire body from the neck down. Do not use CHG on your face, eyes, ears, or genitals.   Pay special attention to the area where your incisions will be located.  Do not scrub your skin too hard.  It is important to allow the CHG soap to sit on your skin for 3-5 minutes.  Rinse the solution off your body completely. Do not wash with your normal soap after the CHG soap solution.  Pat yourself dry with a clean, soft towel.  Do not apply powders, lotions or deodorants as these might block how the CHG soap works.   Dress in clean clothing.  Be sure to sleep with clean, freshly laundered sheets.  Be aware that CHG can cause stains on fabric. Rinse your washcloth and other linens that have contact with  CHG completely. Use only non-chlorine detergents to launder the items used.

## 2024-09-18 NOTE — CPM/PAT H&P
CPM/PAT Evaluation       Name: Duncan Ackerman (Duncan Ackerman)  /Age: 1975/49 y.o.     In-Person       Chief Complaint: Hemorrhoids    HPI49 year old male for excision hemorrhoid 24. Over the last few years patient with worsening hemorrhoids that are internal but prolapse downward causing occasional bleeding, burning and irritation. Occasional rectal spasms. Occasionally uses hemorrhoid cream but has not used recently. Sometimes drives distances for work that causing increased discomfort.     Past Medical History:   Diagnosis Date    Abnormal EKG     Acid reflux     Back pain     Cervical radicular pain     H/O chronic myeloid leukemia     H/O hemorrhoids     Herniated nucleus pulposus, L4-5     Left lumbar radiculopathy     Palpitations     Prolapsed internal hemorrhoids     Rectal hemorrhage     Sleep apnea        Past Surgical History:   Procedure Laterality Date    OTHER SURGICAL HISTORY  2019    Hemorrhoid rubber band ligation    OTHER SURGICAL HISTORY  2020    Foot surgery       Patient Sexual activity questions deferred to the physician.    Family History   Problem Relation Name Age of Onset    Other (cardiac pacemaker) Other aunt        No Known Allergies    Prior to Admission medications    Medication Sig Start Date End Date Taking? Authorizing Provider   clobetasol (Temovate) 0.05 % cream Apply topically 2 times a day. 24  Max Donahue MD   esomeprazole (NexIUM) 40 mg DR capsule Take 1 capsule (40 mg) by mouth once daily. 10/24/23   Max Donahue MD   ferrous gluconate (Fergon) 324 (38 Fe) mg tablet Take 1 tablet (38 mg of iron) by mouth once daily with breakfast.    Historical Provider, MD   hydrocortisone (Anusol-HC) 2.5 % rectal cream Insert into the rectum 4 times a day as needed for hemorrhoids (rectal discomfort). Apply to affected areas 24  Max Donahue MD   imatinib (Gleevec) 400 mg tablet Take 1 tablet (400 mg total)  by mouth once daily.    Historical Provider, MD      Refer to updated medication list on file      Constitutional: Negative for fever, chills, or sweats   ENMT: Negative for nasal discharge, congestion, ear pain, mouth pain, throat pain   Respiratory: Negative for cough, wheezing, shortness of breath   Cardiac: Negative for chest pain, dyspnea on exertion, palpitations   Gastrointestinal: Negative for nausea, vomiting, diarrhea, constipation, abdominal pain Positive for hemorrhoid  Genitourinary: Negative for dysuria, flank pain, frequency, hematuria   Musculoskeletal: Negative for decreased ROM, pain, swelling, weakness   Neurological: Negative for dizziness, confusion, headache  Psychiatric: Negative for mood changes   Skin: Negative for itching, rash, ulcer    Hematologic/Lymph: Negative for bruising, easy bleeding  Allergic/Immunologic: Negative itching, sneezing, swelling       Physical Exam  Constitutional:       Appearance: Normal appearance.   HENT:      Head: Normocephalic.   Eyes:      Extraocular Movements: Extraocular movements intact.   Cardiovascular:      Rate and Rhythm: Normal rate and regular rhythm.      Heart sounds: Normal heart sounds.   Pulmonary:      Effort: Pulmonary effort is normal.      Breath sounds: Normal breath sounds.   Abdominal:      General: Bowel sounds are normal.      Palpations: Abdomen is soft.   Musculoskeletal:         General: Normal range of motion.      Cervical back: Normal range of motion.   Skin:     General: Skin is warm and dry.   Neurological:      Mental Status: He is alert and oriented to person, place, and time.   Psychiatric:         Mood and Affect: Mood normal.        PAT AIRWAY:   Airway:     Neck ROM::  Full   Some missing teeth left side  Cracked tooth left upper   Denies loose teeth    Anesthesia:  Patient denies any anesthesia complications.      Visit Vitals  /78   Pulse 53   Temp 36.4 °C (97.5 °F) (Temporal)   Resp 14       DASI Risk Score       Flowsheet Row Most Recent Value   DASI SCORE 58.2   METS Score (Will be calculated only when all the questions are answered) 9.9          Caprini DVT Assessment      Flowsheet Row Most Recent Value   DVT Score 8   Current Status Major surgery planned, including arthroscopic and laproscopic (1-2 hours)   History Previous malignancy   Age 40-59 years   BMI 31-40 (Obesity)          Modified Frailty Index      Flowsheet Row Most Recent Value   Modified Frailty Index Calculator 0          CHADS2 Stroke Risk         N/A 3 to 100%: High Risk   2 to < 3%: Medium Risk   0 to < 2%: Low Risk     Last Change: N/A          This score determines the patient's risk of having a stroke if the patient has atrial fibrillation.        This score is not applicable to this patient. Components are not calculated.          Revised Cardiac Risk Index      Flowsheet Row Most Recent Value   Revised Cardiac Risk Calculator 0          Apfel Simplified Score    No data to display       Risk Analysis Index Results This Encounter         9/17/2024  1157             CASE Cancer History: Patient does not indicate history of cancer    Total Risk Analysis Index Score Without Cancer: 17    Total Risk Analysis Index Score: 17          Stop Bang Score      Flowsheet Row Most Recent Value   Do you snore loudly? 1   Do you often feel tired or fatigued after your sleep? 0   Has anyone ever observed you stop breathing in your sleep? 1   Do you have or are you being treated for high blood pressure? 0   Recent BMI (Calculated) 33.7   Is BMI greater than 35 kg/m2? 0=No   Age older than 50 years old? 1=Yes   Is your neck circumference greater than 17 inches (Male) or 16 inches (Female)? 0   Gender - Male 1=Yes   STOP-BANG Total Score 4          Assessment and Plan:     49 year old male for excision hemorrhoid 9/27/24  CBC, CMP and Ferritin on file 6/27/24    Respiratory:  Sleep apnea: uses CPAP    Hem/Onc:  CML: on medication    GI:  GERD: on  medication

## 2024-09-25 ENCOUNTER — PREP FOR PROCEDURE (OUTPATIENT)
Dept: SURGERY | Facility: HOSPITAL | Age: 49
End: 2024-09-25
Payer: COMMERCIAL

## 2024-09-25 RX ORDER — SODIUM CHLORIDE, SODIUM LACTATE, POTASSIUM CHLORIDE, CALCIUM CHLORIDE 600; 310; 30; 20 MG/100ML; MG/100ML; MG/100ML; MG/100ML
100 INJECTION, SOLUTION INTRAVENOUS CONTINUOUS
Status: CANCELLED | OUTPATIENT
Start: 2024-09-25

## 2024-09-25 RX ORDER — METRONIDAZOLE 500 MG/100ML
500 INJECTION, SOLUTION INTRAVENOUS ONCE
Status: CANCELLED | OUTPATIENT
Start: 2024-09-25 | End: 2024-09-25

## 2024-09-27 ENCOUNTER — ANESTHESIA EVENT (OUTPATIENT)
Dept: OPERATING ROOM | Facility: HOSPITAL | Age: 49
End: 2024-09-27
Payer: COMMERCIAL

## 2024-09-27 ENCOUNTER — HOSPITAL ENCOUNTER (OUTPATIENT)
Facility: HOSPITAL | Age: 49
Setting detail: OUTPATIENT SURGERY
Discharge: HOME | End: 2024-09-27
Attending: STUDENT IN AN ORGANIZED HEALTH CARE EDUCATION/TRAINING PROGRAM | Admitting: STUDENT IN AN ORGANIZED HEALTH CARE EDUCATION/TRAINING PROGRAM
Payer: COMMERCIAL

## 2024-09-27 ENCOUNTER — ANESTHESIA (OUTPATIENT)
Dept: OPERATING ROOM | Facility: HOSPITAL | Age: 49
End: 2024-09-27
Payer: COMMERCIAL

## 2024-09-27 VITALS
BODY MASS INDEX: 33.73 KG/M2 | OXYGEN SATURATION: 99 % | TEMPERATURE: 97.3 F | RESPIRATION RATE: 16 BRPM | HEIGHT: 74 IN | SYSTOLIC BLOOD PRESSURE: 144 MMHG | HEART RATE: 59 BPM | WEIGHT: 262.79 LBS | DIASTOLIC BLOOD PRESSURE: 80 MMHG

## 2024-09-27 DIAGNOSIS — K64.8 INTERNAL HEMORRHOIDS: Primary | ICD-10-CM

## 2024-09-27 PROCEDURE — 2500000005 HC RX 250 GENERAL PHARMACY W/O HCPCS: Performed by: STUDENT IN AN ORGANIZED HEALTH CARE EDUCATION/TRAINING PROGRAM

## 2024-09-27 PROCEDURE — 7100000009 HC PHASE TWO TIME - INITIAL BASE CHARGE: Performed by: STUDENT IN AN ORGANIZED HEALTH CARE EDUCATION/TRAINING PROGRAM

## 2024-09-27 PROCEDURE — 2500000001 HC RX 250 WO HCPCS SELF ADMINISTERED DRUGS (ALT 637 FOR MEDICARE OP): Performed by: STUDENT IN AN ORGANIZED HEALTH CARE EDUCATION/TRAINING PROGRAM

## 2024-09-27 PROCEDURE — 46255 REMOVE INT/EXT HEM 1 GROUP: CPT | Performed by: STUDENT IN AN ORGANIZED HEALTH CARE EDUCATION/TRAINING PROGRAM

## 2024-09-27 PROCEDURE — 2500000004 HC RX 250 GENERAL PHARMACY W/ HCPCS (ALT 636 FOR OP/ED): Performed by: NURSE ANESTHETIST, CERTIFIED REGISTERED

## 2024-09-27 PROCEDURE — 3600000007 HC OR TIME - EACH INCREMENTAL 1 MINUTE - PROCEDURE LEVEL TWO: Performed by: STUDENT IN AN ORGANIZED HEALTH CARE EDUCATION/TRAINING PROGRAM

## 2024-09-27 PROCEDURE — 7100000001 HC RECOVERY ROOM TIME - INITIAL BASE CHARGE: Performed by: STUDENT IN AN ORGANIZED HEALTH CARE EDUCATION/TRAINING PROGRAM

## 2024-09-27 PROCEDURE — 3700000001 HC GENERAL ANESTHESIA TIME - INITIAL BASE CHARGE: Performed by: STUDENT IN AN ORGANIZED HEALTH CARE EDUCATION/TRAINING PROGRAM

## 2024-09-27 PROCEDURE — 2500000004 HC RX 250 GENERAL PHARMACY W/ HCPCS (ALT 636 FOR OP/ED): Mod: JZ | Performed by: STUDENT IN AN ORGANIZED HEALTH CARE EDUCATION/TRAINING PROGRAM

## 2024-09-27 PROCEDURE — 7100000002 HC RECOVERY ROOM TIME - EACH INCREMENTAL 1 MINUTE: Performed by: STUDENT IN AN ORGANIZED HEALTH CARE EDUCATION/TRAINING PROGRAM

## 2024-09-27 PROCEDURE — 7100000010 HC PHASE TWO TIME - EACH INCREMENTAL 1 MINUTE: Performed by: STUDENT IN AN ORGANIZED HEALTH CARE EDUCATION/TRAINING PROGRAM

## 2024-09-27 PROCEDURE — 3700000002 HC GENERAL ANESTHESIA TIME - EACH INCREMENTAL 1 MINUTE: Performed by: STUDENT IN AN ORGANIZED HEALTH CARE EDUCATION/TRAINING PROGRAM

## 2024-09-27 PROCEDURE — 2500000005 HC RX 250 GENERAL PHARMACY W/O HCPCS: Performed by: NURSE ANESTHETIST, CERTIFIED REGISTERED

## 2024-09-27 PROCEDURE — 3600000002 HC OR TIME - INITIAL BASE CHARGE - PROCEDURE LEVEL TWO: Performed by: STUDENT IN AN ORGANIZED HEALTH CARE EDUCATION/TRAINING PROGRAM

## 2024-09-27 RX ORDER — OXYCODONE AND ACETAMINOPHEN 5; 325 MG/1; MG/1
1 TABLET ORAL EVERY 6 HOURS PRN
Qty: 12 TABLET | Refills: 0 | Status: SHIPPED | OUTPATIENT
Start: 2024-09-27

## 2024-09-27 RX ORDER — SODIUM CHLORIDE, SODIUM LACTATE, POTASSIUM CHLORIDE, CALCIUM CHLORIDE 600; 310; 30; 20 MG/100ML; MG/100ML; MG/100ML; MG/100ML
100 INJECTION, SOLUTION INTRAVENOUS CONTINUOUS
Status: DISCONTINUED | OUTPATIENT
Start: 2024-09-27 | End: 2024-09-30 | Stop reason: HOSPADM

## 2024-09-27 RX ORDER — ROCURONIUM BROMIDE 50 MG/5 ML
SYRINGE (ML) INTRAVENOUS AS NEEDED
Status: DISCONTINUED | OUTPATIENT
Start: 2024-09-27 | End: 2024-09-27

## 2024-09-27 RX ORDER — LIDOCAINE HYDROCHLORIDE 20 MG/ML
INJECTION, SOLUTION EPIDURAL; INFILTRATION; INTRACAUDAL; PERINEURAL AS NEEDED
Status: DISCONTINUED | OUTPATIENT
Start: 2024-09-27 | End: 2024-09-27

## 2024-09-27 RX ORDER — ACETAMINOPHEN 325 MG/1
650 TABLET ORAL EVERY 4 HOURS PRN
Status: DISCONTINUED | OUTPATIENT
Start: 2024-09-27 | End: 2024-09-30 | Stop reason: HOSPADM

## 2024-09-27 RX ORDER — PROPOFOL 10 MG/ML
INJECTION, EMULSION INTRAVENOUS AS NEEDED
Status: DISCONTINUED | OUTPATIENT
Start: 2024-09-27 | End: 2024-09-27

## 2024-09-27 RX ORDER — HYDROMORPHONE HYDROCHLORIDE 1 MG/ML
INJECTION, SOLUTION INTRAMUSCULAR; INTRAVENOUS; SUBCUTANEOUS AS NEEDED
Status: DISCONTINUED | OUTPATIENT
Start: 2024-09-27 | End: 2024-09-27

## 2024-09-27 RX ORDER — KETOROLAC TROMETHAMINE 30 MG/ML
INJECTION, SOLUTION INTRAMUSCULAR; INTRAVENOUS AS NEEDED
Status: DISCONTINUED | OUTPATIENT
Start: 2024-09-27 | End: 2024-09-27

## 2024-09-27 RX ORDER — METRONIDAZOLE 500 MG/100ML
500 INJECTION, SOLUTION INTRAVENOUS ONCE
Status: COMPLETED | OUTPATIENT
Start: 2024-09-27 | End: 2024-09-27

## 2024-09-27 RX ORDER — MIDAZOLAM HYDROCHLORIDE 1 MG/ML
INJECTION, SOLUTION INTRAMUSCULAR; INTRAVENOUS AS NEEDED
Status: DISCONTINUED | OUTPATIENT
Start: 2024-09-27 | End: 2024-09-27

## 2024-09-27 RX ORDER — HYDROMORPHONE HYDROCHLORIDE 0.2 MG/ML
0.2 INJECTION INTRAMUSCULAR; INTRAVENOUS; SUBCUTANEOUS EVERY 5 MIN PRN
Status: DISCONTINUED | OUTPATIENT
Start: 2024-09-27 | End: 2024-09-30 | Stop reason: HOSPADM

## 2024-09-27 RX ORDER — ONDANSETRON HYDROCHLORIDE 2 MG/ML
INJECTION, SOLUTION INTRAVENOUS AS NEEDED
Status: DISCONTINUED | OUTPATIENT
Start: 2024-09-27 | End: 2024-09-27

## 2024-09-27 RX ORDER — IBUPROFEN 600 MG/1
600 TABLET ORAL EVERY 6 HOURS PRN
COMMUNITY
Start: 2024-09-27

## 2024-09-27 RX ORDER — CEFAZOLIN SODIUM 2 G/100ML
2 INJECTION, SOLUTION INTRAVENOUS ONCE
Status: COMPLETED | OUTPATIENT
Start: 2024-09-27 | End: 2024-09-27

## 2024-09-27 RX ORDER — OXYCODONE HYDROCHLORIDE 10 MG/1
10 TABLET ORAL EVERY 4 HOURS PRN
Status: DISCONTINUED | OUTPATIENT
Start: 2024-09-27 | End: 2024-09-30 | Stop reason: HOSPADM

## 2024-09-27 RX ORDER — OXYCODONE HYDROCHLORIDE 5 MG/1
5 TABLET ORAL EVERY 4 HOURS PRN
Status: DISCONTINUED | OUTPATIENT
Start: 2024-09-27 | End: 2024-09-30 | Stop reason: HOSPADM

## 2024-09-27 RX ORDER — LABETALOL HYDROCHLORIDE 5 MG/ML
5 INJECTION, SOLUTION INTRAVENOUS ONCE AS NEEDED
Status: DISCONTINUED | OUTPATIENT
Start: 2024-09-27 | End: 2024-09-30 | Stop reason: HOSPADM

## 2024-09-27 RX ORDER — LIDOCAINE HYDROCHLORIDE 10 MG/ML
0.1 INJECTION, SOLUTION INFILTRATION; PERINEURAL ONCE
Status: DISCONTINUED | OUTPATIENT
Start: 2024-09-27 | End: 2024-09-30 | Stop reason: HOSPADM

## 2024-09-27 RX ORDER — ONDANSETRON HYDROCHLORIDE 2 MG/ML
4 INJECTION, SOLUTION INTRAVENOUS ONCE AS NEEDED
Status: DISCONTINUED | OUTPATIENT
Start: 2024-09-27 | End: 2024-09-30 | Stop reason: HOSPADM

## 2024-09-27 RX ORDER — FENTANYL CITRATE 50 UG/ML
INJECTION, SOLUTION INTRAMUSCULAR; INTRAVENOUS AS NEEDED
Status: DISCONTINUED | OUTPATIENT
Start: 2024-09-27 | End: 2024-09-27

## 2024-09-27 RX ORDER — BUPIVACAINE HCL/EPINEPHRINE 0.5-1:200K
VIAL (ML) INJECTION AS NEEDED
Status: DISCONTINUED | OUTPATIENT
Start: 2024-09-27 | End: 2024-09-27 | Stop reason: HOSPADM

## 2024-09-27 RX ORDER — DOCUSATE SODIUM 100 MG/1
100 CAPSULE, LIQUID FILLED ORAL 2 TIMES DAILY
Qty: 20 CAPSULE | Refills: 0 | Status: SHIPPED | OUTPATIENT
Start: 2024-09-27 | End: 2024-10-07

## 2024-09-27 SDOH — HEALTH STABILITY: MENTAL HEALTH: CURRENT SMOKER: 0

## 2024-09-27 ASSESSMENT — PAIN - FUNCTIONAL ASSESSMENT
PAIN_FUNCTIONAL_ASSESSMENT: 0-10
PAIN_FUNCTIONAL_ASSESSMENT: 0-10
PAIN_FUNCTIONAL_ASSESSMENT: UNABLE TO SELF-REPORT
PAIN_FUNCTIONAL_ASSESSMENT: 0-10

## 2024-09-27 ASSESSMENT — ENCOUNTER SYMPTOMS
ABDOMINAL PAIN: 0
COLOR CHANGE: 0
CHILLS: 0
LIGHT-HEADEDNESS: 0
TROUBLE SWALLOWING: 0
DIFFICULTY URINATING: 0
SHORTNESS OF BREATH: 0
DIZZINESS: 0
FEVER: 0
DIARRHEA: 0
ARTHRALGIAS: 0
DYSURIA: 0
VOMITING: 0
JOINT SWELLING: 0
NUMBNESS: 0
AGITATION: 0
HEMATURIA: 0
NAUSEA: 0

## 2024-09-27 ASSESSMENT — PAIN SCALES - GENERAL
PAIN_LEVEL: 1
PAINLEVEL_OUTOF10: 0 - NO PAIN
PAINLEVEL_OUTOF10: 1
PAINLEVEL_OUTOF10: 1
PAINLEVEL_OUTOF10: 0 - NO PAIN

## 2024-09-27 ASSESSMENT — COLUMBIA-SUICIDE SEVERITY RATING SCALE - C-SSRS
2. HAVE YOU ACTUALLY HAD ANY THOUGHTS OF KILLING YOURSELF?: NO
6. HAVE YOU EVER DONE ANYTHING, STARTED TO DO ANYTHING, OR PREPARED TO DO ANYTHING TO END YOUR LIFE?: NO
1. IN THE PAST MONTH, HAVE YOU WISHED YOU WERE DEAD OR WISHED YOU COULD GO TO SLEEP AND NOT WAKE UP?: NO

## 2024-09-27 NOTE — H&P
History Of Present Illness  Duncan Ackerman is a 49 y.o. male with a history of CML (in remission) who presents today with recurrent hemorrhoids despite banding. He was last seen in clinic on 7/24/24 by Dr. Carter and was found to have a L lateral partially prolapsed internal hemorrhoid.     Past Medical History  Past Medical History:   Diagnosis Date    Abnormal EKG     Acid reflux     Back pain     Cervical radicular pain     H/O chronic myeloid leukemia     H/O hemorrhoids     Herniated nucleus pulposus, L4-5     Left lumbar radiculopathy     Palpitations     Prolapsed internal hemorrhoids     Rectal hemorrhage     Sleep apnea        Surgical History  Past Surgical History:   Procedure Laterality Date    OTHER SURGICAL HISTORY  12/23/2019    Hemorrhoid rubber band ligation    OTHER SURGICAL HISTORY  09/25/2020    Foot surgery        Social History  He reports that he has never smoked. He has never used smokeless tobacco. He reports that he does not currently use alcohol. He reports that he does not use drugs.    Family History  Family History   Problem Relation Name Age of Onset    Other (cardiac pacemaker) Other aunt         Allergies  Patient has no known allergies.    Review of Systems   Constitutional:  Negative for chills and fever.   HENT:  Negative for trouble swallowing.    Respiratory:  Negative for shortness of breath.    Cardiovascular:  Negative for chest pain.   Gastrointestinal:  Negative for abdominal pain, diarrhea, nausea and vomiting.   Genitourinary:  Negative for difficulty urinating, dysuria and hematuria.   Musculoskeletal:  Negative for arthralgias and joint swelling.   Skin:  Negative for color change.   Neurological:  Negative for dizziness, light-headedness and numbness.   Psychiatric/Behavioral:  Negative for agitation and behavioral problems.         Physical Exam  Vitals reviewed.   Constitutional:       General: He is not in acute distress.     Appearance: Normal appearance.   HENT:       Head: Normocephalic and atraumatic.   Cardiovascular:      Rate and Rhythm: Normal rate and regular rhythm.   Pulmonary:      Effort: Pulmonary effort is normal. No respiratory distress.   Abdominal:      General: Abdomen is flat.   Musculoskeletal:         General: No swelling, tenderness or deformity.   Skin:     General: Skin is warm and dry.   Neurological:      General: No focal deficit present.      Mental Status: He is alert and oriented to person, place, and time.   Psychiatric:         Mood and Affect: Mood normal.         Behavior: Behavior normal.         Thought Content: Thought content normal.          Last Recorded Vitals  There were no vitals taken for this visit.    Relevant Results  N/A     Assessment/Plan   Assessment & Plan  Internal hemorrhoids      Duncan Ackerman is a 49 y.o. male who presents today with partially prolapsed L lateral internal hemorrhoids for EUA with hemorrhoidectomy by Dr. Carter.        I spent 5 minutes in the professional and overall care of this patient.      Alysa Thompson MD

## 2024-09-27 NOTE — BRIEF OP NOTE
Date: 2024  OR Location: [facility] OR    Name: Duncan Ackerman, : 1975, Age: 49 y.o., MRN: [id number], Sex: male    Diagnosis  Pre-op Diagnosis      * Internal hemorrhoids [K64.8] Post-op Diagnosis     * Internal hemorrhoids [K64.8]     Procedures  Excision Hemorrhoid  82585 - MS HEMORRHOIDECTOMY NTRNL & XTRNL 1 COLUMN/GROUP      Surgeons      * Duncan Carter - Primary    Resident/Fellow/Other Assistant:     * Alysa Thompson MD - Fellow    Procedure Summary  Anesthesia: General  ASA: II  Anesthesia Staff: Anesthesiologist: Heath Dixon DO  CRNA: STU Couch-CRNA  Estimated Blood Loss: 20mL  Intra-op Medications:   Administrations occurring from 1150 to 1335 on 24:   Medication Name Total Dose   BUPivacaine-EPINEPHrine (Marcaine w/EPI) 0.5 %-1:200,000 injection 30 mL   surgical lubricant gel 1 Application   gelatin absorbable (Gelfoam) 100 sponge 1 each   ceFAZolin (Ancef) 2 g in dextrose (iso)  mL 2 g              Anesthesia Record               Intraprocedure I/O Totals          Intake    LR bolus 1000.00 mL    Total Intake 1000 mL          Specimen: No specimens collected     Staff:   Relief Circulator: Gabe  Circulator: Viet  Circulator: Justin Valencia Person: Mitra          Findings: Large left lateral external and internal hemorrhoid excised.  Moderate anterior internal hemorrhoid suture ligated.    Complications:  None; patient tolerated the procedure well.     Disposition: PACU - hemodynamically stable.  Condition: stable  Specimens Collected: No specimens collected  Attending Attestation: I was present and scrubbed for the entire procedure.    Duncan Carter  Phone Number: [phone]

## 2024-09-27 NOTE — ANESTHESIA PREPROCEDURE EVALUATION
Patient: Duncan Ackerman    Procedure Information       Date/Time: 09/27/24 1150    Procedure: Excision Hemorrhoid    Location: [facility] OR 07 / [facility] OR    Surgeons: Duncan Carter MD            Relevant Problems   Anesthesia (within normal limits)      Cardiac   (+) Abnormal ECG   (+) Supraventricular tachycardia (CMS-HCC)      Pulmonary   (+) Atypical pneumonia   (+) Obstructive sleep apnea of adult   (+) Obstructive sleep apnea syndrome in adult      Neuro   (+) Left lumbar radiculopathy   (+) Lumbar radiculopathy      GI   (+) Acid reflux   (+) Bleeding internal hemorrhoids   (+) Gastroesophageal reflux disease   (+) Rectal hemorrhage      Hematology   (+) BCR/ABL1-positive chronic myeloid leukemia (CML) in remission (Multi)   (+) Chronic myeloid leukemia (Multi)      Musculoskeletal   (+) Herniated nucleus pulposus, L4-5   (+) Herniation of lumbar intervertebral disc without myelopathy      ID   (+) Atypical pneumonia       Clinical information reviewed:   Tobacco  Allergies  Meds   Med Hx  Surg Hx   Fam Hx  Soc Hx        NPO Detail:  NPO/Void Status  Carbohydrate Drink Given Prior to Surgery? : N  Date of Last Liquid: 09/27/24  Time of Last Liquid: 0900  Date of Last Solid: 09/26/24  Time of Last Solid: 2100  Last Intake Type: Clear fluids  Time of Last Void: 1030         Physical Exam    Airway  Mallampati: II  TM distance: >3 FB  Neck ROM: full     Cardiovascular   Rhythm: regular  Rate: normal     Dental - normal exam     Pulmonary   Breath sounds clear to auscultation     Abdominal   (+) obese  Abdomen: soft             Anesthesia Plan    History of general anesthesia?: yes  History of complications of general anesthesia?: no    ASA 2     general     The patient is not a current smoker.    intravenous induction   Postoperative administration of opioids is intended.  Trial extubation is planned.  Anesthetic plan and risks discussed with patient.  Use of blood products discussed with patient who  consented to blood products.    Plan discussed with CRNA.

## 2024-09-27 NOTE — ANESTHESIA PROCEDURE NOTES
Peripheral IV  Date/Time: 9/27/2024 12:25 PM      Placement  Needle size: 18 G  Laterality: right  Location: hand  Site prep: alcohol  Technique: anatomical landmarks  Attempts: 1

## 2024-09-27 NOTE — ANESTHESIA POSTPROCEDURE EVALUATION
Patient: Duncan Ackerman    Procedure Summary       Date: 09/27/24 Room / Location: [facility] OR 07 / [facility] OR    Anesthesia Start: 1213 Anesthesia Stop: 1319    Procedure: Excision Hemorrhoid (Anus) Diagnosis:       Internal hemorrhoids      (Internal hemorrhoids [K64.8])    Surgeons: Duncan Carter MD Responsible Provider: Heath Dixon DO    Anesthesia Type: general ASA Status: 2            Anesthesia Type: general    Vitals Value Taken Time   /80 09/27/24 1422   Temp 36.3 °C (97.3 °F) 09/27/24 1415   Pulse 54 09/27/24 1422   Resp 14 09/27/24 1422   SpO2 96 % 09/27/24 1422   Vitals shown include unfiled device data.    Anesthesia Post Evaluation    Patient location during evaluation: PACU  Patient participation: complete - patient participated  Level of consciousness: awake and alert  Pain score: 1  Pain management: adequate  Airway patency: patent  Cardiovascular status: acceptable  Respiratory status: acceptable  Hydration status: acceptable  Postoperative Nausea and Vomiting: none        No notable events documented.

## 2024-09-27 NOTE — OP NOTE
Excision Hemorrhoid Operative Note     Date: 2024  OR Location: [facility] OR    Name: Duncan Ackerman, : 1975, Age: 49 y.o., MRN: [id number], Sex: male    Diagnosis  Pre-op Diagnosis      * Internal hemorrhoids [K64.8] Post-op Diagnosis     * Internal hemorrhoids [K64.8]     Procedures  Excisional hemorrhoidectomy, left lateral external and internal column  Suture ligation of anterior internal hemorrhoid    Surgeons      * Duncan Carter - Primary    Resident/Fellow/Other Assistant:     * Alysa Thompson MD - Fellow    Procedure Summary  Anesthesia: General  ASA: II  Anesthesia Staff: Anesthesiologist: Heath Dixon DO  CRNA: STU Couch-CRNA  Estimated Blood Loss: 20mL  Intra-op Medications:   Administrations occurring from 1150 to 1335 on 24:   Medication Name Total Dose   BUPivacaine-EPINEPHrine (Marcaine w/EPI) 0.5 %-1:200,000 injection 30 mL   surgical lubricant gel 1 Application   gelatin absorbable (Gelfoam) 100 sponge 1 each   ceFAZolin (Ancef) 2 g in dextrose (iso)  mL 2 g              Anesthesia Record               Intraprocedure I/O Totals          Intake    LR bolus 1000.00 mL    Total Intake 1000 mL          Specimen: No specimens collected     Staff:   Relief Circulator: Gabe  Circulator: Viet  Circulator: Justin Valencia Person: Mitra         Drains and/or Catheters: * None in log *    Tourniquet Times:         Implants:     Findings: Large left lateral external and internal hemorrhoid excised. Moderate anterior internal hemorrhoid suture ligated.     Indications: Duncan Ackerman is an 49 y.o. male who is having surgery for Internal hemorrhoids [K64.8].     The patient was seen in the preoperative area. The risks, benefits, complications, treatment options, non-operative alternatives, expected recovery and outcomes were discussed with the patient. The possibilities of reaction to medication, pulmonary aspiration, injury to surrounding structures, bleeding, recurrent  infection, the need for additional procedures, failure to diagnose a condition, and creating a complication requiring transfusion or operation were discussed with the patient. The patient concurred with the proposed plan, giving informed consent.  The site of surgery was properly noted/marked if necessary per policy. The patient has been actively warmed in preoperative area. Preoperative antibiotics have been ordered and given within 1 hours of incision. Venous thrombosis prophylaxis have been ordered including bilateral sequential compression devices    Procedure Details: Preoperative checklist performed in the preoperative area confirming correct patient and correct procedure.  The patient was brought to the operating room.  Sequential compression devices were applied to bilateral lower extremities.  Following induction of general anesthesia, the patient was placed in prone anshu-knife position.  Careful attention was paid to proper positioning of bilateral arms and padding of pressure points.  The perianal region was prepped with betadine solution.  The patient was draped in the usual sterile fashion.  A time out was performed, once again confirming correct patient and correct procedure.  Following completion of perioperative antibiotics, a perianal exam was performed.  There was a small-moderate left lateral non-thrombosed external hemorrhoid.  A well-lubricated digital rectal exam was performed and notable for large palpable internal hemorrhoid component in left lateral position.  A lubricated lighted Hill-Tan retractor was inserted into the anorectal canal and 360 degree examination performed.  There was a large internal hemorrhoid prolapsing component in left lateral position.  There was a smaller but moderately sized internal hemorrhoid in anterior position.  The left lateral external and internal hemorrhoid column was grasped with Debakey forceps and retracted perpendicular to the anorectal canal.  The  skin at the anal verge was anesthetized with 0.5% marcaine with epinephrine.  Using the #15 scalpel blade, the skin at the verge was incised and extended superiorly along both sides of the hemorrhoid column towards the hemorrhoidal pedicle.  Electrocautery was used for hemostasis.  The sphincter complex was dissected away from the hemorrhoidal tissue.  As the incision was carried towards the apex of the hemorrhoid, the apex was clamped with a Myrna forceps and the overlying hemorrhoidal tissue excised with Metzenbaum scissors.  The apex was suture ligated with a 2-0 vicryl which was then run in continuous fashion distally towards the verge.  Small bites of underlying sphincter were taken to obliterate any dead space.  At the very distal aspect of the anal verge incision, a small opening was left for drainage.  The anorectal canal was irrigated with normal saline.  A second figure-of-eight 2-0 vicryl stitch was placed at the apex.  There was now good hemostasis.  The smaller but moderately sized internal hemorrhoid in the anterior midline position was suture ligated with a 2-0 vicryl stitch.  Hemostasis was once again verified.  Additional 0.5% marcaine with epinephrine was injected circumferentially.  The perianal region was cleansed and dried.  A count of instruments, needles, and sponges was performed and confirmed to be correct.  A sign out was performed.  The patient was awakened and taken to the recovery area in stable condition.     Complications:  None; patient tolerated the procedure well.    Disposition: PACU - hemodynamically stable.  Condition: stable         Additional Details: N/A    Attending Attestation: I was present and scrubbed for the entire procedure.    Duncan Carter  Phone Number: [phone]

## 2024-09-27 NOTE — DISCHARGE INSTRUCTIONS
"POST-OPERATIVE INSTRUCTIONS FOR  AMBULATORY ANORECTAL SURGERY        Wound Care  -Take all the bandages off in the late afternoon or evening and take the first hot bath  -The bath water should be as warm as tolerable, without causing burns  -It is NOT necessary to add anything to the water (such as Epsom salt)  -Hot baths should be used at least 3-4 times each day AND especially after each bowel movement  -The \"packing\" should be removed from the anus during the first bath  -Expect some oozing or slight bleeding  -No bandages are necessary, but may be used as desired to absorb any drainage: plain gauze or maxi pads    Pain  -The anesthetic that was injected at the time of surgery should last 3-6 hours  -It is normal for the anal area to be very painful for several days, and especially after the initial bowel movements  -HOT BATHS PROVIDE THE BEST PAIN RELIEF, and should be used liberally  -The prescription you have been given is for a strong narcotic pain medication. Take 1-2 tabs every 4-6 hours as needed for pain  -Use the pain medication as necessary, but be aware that it will cause some degree of constipation  -If the pain is less severe, Advil (up to 600 mg every 4 hours). Do not take Percocet AND Tylenol together at the same time. You may take Percocet and Advil.     Skin Irritation:  -If you have increased drainage after your procedure, this can cause skin irritation which can be uncomfortable.   -To minimize skin irritation keep skin clean and dry by patting area dry or use a hair dryer on cool to eliminate moisture. Use a skin barrier cream around anus such as Aquaphor.   -For excess moisture, place a cotton ball or 4x4 gauze pad on the outside of anus and change as needed. This will wick away moisture from skin.    Diet  -Eat as much fiber as possible: fruits, vegetables, salads, whole grain breads, bran cereals, bran muffins, prunes and prune juice  -Drink lots of fluids: water and juices  -Avoid spicy " foods until the wounds are healed    Bowel Movements  -It is common not to move your bowels for 2 or 3 days after surgery  -It is important to try to avoid becoming constipated  -A high fiber diet is essential  -Take fiber powder such as Metamucil once or twice a day, and/or Colace three times a day  -Excessive pressure in the rectum (or even pain) may indicate the need to have a bowel movement  -If a laxative is required usually try two tablespoons of Milk of Magnesia; if this is not effective, take Miralax  -THE FIRST BOWEL MOVEMENT HURTS!!!    Activity  -You may resume normal activities, including exercising, as soon as you feel up to it  -You can return to work whenever you feel able  -There is no way you can do yourself any harm or affect the success of the surgery by excessive activity    Things to Watch For  -A small amount of bleeding or oozing is normal, especially with bowel movements; if bleeding is heavy, or does not stop after bowel movements, call me immediately.  -It is not unusual to have difficulty urinating after surgery; often it is necessary it urinate while sitting in the hot bath; frequent urination of very small volumes is abnormal and requires that you call me. If you have not urinated at all by the first evening, you must call.    Medications:  Resume your medication(s) as prescribed, unless otherwise directed.      Follow-up Information  -If you do not already have an appointment, call my office within a few days, and make an appointment for a check-up about 3-4 weeks after your surgery

## 2024-09-27 NOTE — ANESTHESIA PROCEDURE NOTES
Airway  Date/Time: 9/27/2024 12:19 PM  Urgency: elective    Airway not difficult    Staffing  Performed: CRNA   Authorized by: Heath Dixon DO    Performed by: STU Couch-GARY  Patient location during procedure: OR    Indications and Patient Condition  Indications for airway management: anesthesia and airway protection  Spontaneous ventilation: present  Sedation level: deep  Preoxygenated: yes  Patient position: sniffing  MILS maintained throughout  Mask difficulty assessment: 1 - vent by mask    Final Airway Details  Final airway type: endotracheal airway      Successful airway: ETT  Cuffed: yes   Successful intubation technique: direct laryngoscopy  Blade: Vicky  Blade size: #4  ETT size (mm): 7.5  Cormack-Lehane Classification: grade I - full view of glottis  Placement verified by: chest auscultation and capnometry   Measured from: lips  ETT to lips (cm): 25  Number of attempts at approach: 1

## 2024-10-09 DIAGNOSIS — I10 HTN (HYPERTENSION), BENIGN: ICD-10-CM

## 2024-10-09 LAB
LABORATORY COMMENT REPORT: NORMAL
PATH REPORT.FINAL DX SPEC: NORMAL
PATH REPORT.GROSS SPEC: NORMAL
PATH REPORT.RELEVANT HX SPEC: NORMAL
PATH REPORT.TOTAL CANCER: NORMAL

## 2024-10-09 RX ORDER — METOPROLOL SUCCINATE 25 MG/1
25 TABLET, EXTENDED RELEASE ORAL DAILY
Qty: 90 TABLET | Refills: 1 | Status: SHIPPED | OUTPATIENT
Start: 2024-10-09 | End: 2025-04-07

## 2024-10-10 ASSESSMENT — ENCOUNTER SYMPTOMS
VOMITING: 0
CHILLS: 0
HEMATURIA: 0
FATIGUE: 0
DIAPHORESIS: 0
SHORTNESS OF BREATH: 0
NAUSEA: 0
ACTIVITY CHANGE: 0
BLOOD IN STOOL: 0
COUGH: 0
CONSTIPATION: 0
ABDOMINAL PAIN: 0
WEAKNESS: 0
FEVER: 0
UNEXPECTED WEIGHT CHANGE: 0
ABDOMINAL DISTENTION: 0
RECTAL PAIN: 1
DIZZINESS: 0
DIARRHEA: 0
LIGHT-HEADEDNESS: 0
DIFFICULTY URINATING: 0
CHEST TIGHTNESS: 0
APPETITE CHANGE: 0
DYSURIA: 0
PALPITATIONS: 0
ANAL BLEEDING: 1

## 2024-10-10 NOTE — PROGRESS NOTES
"HPI    Duncan Ackerman is a 49 y.o. male with a hx of chronic myeloid leukemia (in remission), who was seen by DONNIE Ness for hemorrhoids. He was seen in office on 12/18/23 and found to have large internal prolapsing inflamed hemorrhoids. He is s/p excisional hemorrhoidectomy on 9/27/24. Path demonstrating squamocolumnar mucosa with hemorrhoids. He recently called the office with new pain and pressure associated with bms since the procedure. He presents today to discuss.     He is accompanied by his wife today. He reports healing was better than expected, aside from pain with flatulence and bms. He describes a spasm yesterday before and after a bm. He has 1-2 formed bms daily. He denies hematochezia or melena. He denies incontinence. He denies dysuria or hematuria. He denies pus or drainage per rectum. He denies abdominal pain. Normal appetite. Denies nausea or vomiting. He denies bulging from anus. He describes the stitch he feels as \"pulling\".  Has resumed his imatinib therapy.    Flexible sigmoidoscopy 7/26/22 ([facility]): - Hemorrhoids.  - Non-thrombosed internal hemorrhoids and internal hemorrhoids that prolapse with straining, but require manual replacement into the anal canal (Grade III) found on perineal exam.  - Bleeding internal hemorrhoids. Banded.  -No specimens collected.     Colonoscopy 6/21/22 ([facility]): - One 2 mm polyp in the descending colon, removed with a cold biopsy forceps. Resected and retrieved.  Path fragments of tubular adenoma.   - One 5 mm polyp in the sigmoid colon, removed with a cold snare. Resected and retrieved. Path fragments of tubular adenoma.   - Non-bleeding internal hemorrhoids. Banded.   - The examination was otherwise normal.   - Repeat in 5 years for surveillance.     Non-smoker/No ETOH/No Illicit drug use  PMH: GERD, Chronic myeloid leukemia-in remission,   PSH: Bandings,   No family history of CRC or IBD  Employment: [employer]    Past Medical History:   Diagnosis " Date    Abnormal EKG     Acid reflux     Back pain     Cervical radicular pain     H/O chronic myeloid leukemia     H/O hemorrhoids     Herniated nucleus pulposus, L4-5     Left lumbar radiculopathy     Palpitations     Prolapsed internal hemorrhoids     Rectal hemorrhage     Sleep apnea        Past Surgical History:   Procedure Laterality Date    OTHER SURGICAL HISTORY  2019    Hemorrhoid rubber band ligation    OTHER SURGICAL HISTORY  2020    Foot surgery       No Known Allergies    Review of Systems   Constitutional:  Negative for activity change, appetite change, chills, diaphoresis, fatigue, fever and unexpected weight change.   Respiratory:  Negative for cough, chest tightness and shortness of breath.    Cardiovascular:  Negative for chest pain, palpitations and leg swelling.   Gastrointestinal:  Positive for anal bleeding and rectal pain. Negative for abdominal distention, abdominal pain, blood in stool, constipation, diarrhea, nausea and vomiting.   Genitourinary:  Negative for difficulty urinating, dysuria and hematuria.   Neurological:  Negative for dizziness, weakness and light-headedness.   All other systems reviewed and are negative.    Current Outpatient Medications on File Prior to Visit   Medication Sig Dispense Refill    [] docusate sodium (Colace) 100 mg capsule Take 1 capsule (100 mg) by mouth 2 times a day for 10 days. 20 capsule 0    esomeprazole (NexIUM) 40 mg DR capsule Take 1 capsule (40 mg) by mouth once daily. 90 capsule 3    ferrous gluconate (Fergon) 324 (38 Fe) mg tablet Take 1 tablet (38 mg of iron) by mouth once daily with breakfast.      ibuprofen 600 mg tablet Take 1 tablet (600 mg) by mouth every 6 hours if needed for mild pain (1 - 3).      imatinib (Gleevec) 400 mg tablet Take 1 tablet (400 mg total) by mouth once daily.      metoprolol succinate XL (Toprol-XL) 25 mg 24 hr tablet Take 1 tablet (25 mg) by mouth once daily. Do not crush or chew. 90 tablet 1     oxyCODONE-acetaminophen (Percocet) 5-325 mg tablet Take 1 tablet by mouth every 6 hours if needed for severe pain (7 - 10). 12 tablet 0     No current facility-administered medications on file prior to visit.       Physical Exam  Vitals reviewed. Exam conducted with a chaperone present.   Constitutional:       Appearance: Normal appearance.   HENT:      Head: Normocephalic.   Eyes:      Pupils: Pupils are equal, round, and reactive to light.   Pulmonary:      Effort: Pulmonary effort is normal. No respiratory distress.   Abdominal:      General: There is no distension.      Palpations: Abdomen is soft. There is no mass.      Tenderness: There is no abdominal tenderness.      Hernia: No hernia is present.   Genitourinary:     Comments: Left lateral hemorrhoidectomy wound C/D/I w/ vicryl stitch in place.  There is no significant edema/induration/fluctuance/drainage.  No evidence of infection.  Musculoskeletal:         General: No swelling or deformity. Normal range of motion.      Cervical back: Normal range of motion.      Right lower leg: No edema.      Left lower leg: No edema.   Skin:     General: Skin is warm and dry.      Capillary Refill: Capillary refill takes less than 2 seconds.   Neurological:      General: No focal deficit present.      Mental Status: He is alert and oriented to person, place, and time. Mental status is at baseline.   Psychiatric:         Mood and Affect: Mood normal.         Behavior: Behavior normal.         Thought Content: Thought content normal.         Judgment: Judgment normal.       Assessment and Plan:   #Prolapsing left lateral internal hemorrhoid and large left lateral external hemorrhoid S/P hemorrhoidectomy 9/27/2024; also had moderate anterior internal hemorrhoid suture ligated  #CML on chronic imatinib therapy  -  Expected post-operative course thus far  -  Pathology reviewed with patient  -  Follow-up in 2-3 weeks for recheck   -  Instructed to stop using aquafor around  perianal skin and keep site C/D with gauze dressing    Duncan Carter MD   10/15/2024  9:43 AM

## 2024-10-15 ENCOUNTER — APPOINTMENT (OUTPATIENT)
Dept: SURGERY | Facility: CLINIC | Age: 49
End: 2024-10-15
Payer: COMMERCIAL

## 2024-10-15 VITALS
HEART RATE: 58 BPM | BODY MASS INDEX: 33.62 KG/M2 | SYSTOLIC BLOOD PRESSURE: 154 MMHG | HEIGHT: 74 IN | DIASTOLIC BLOOD PRESSURE: 93 MMHG | WEIGHT: 262 LBS

## 2024-10-15 DIAGNOSIS — C92.10 CHRONIC MYELOID LEUKEMIA (MULTI): ICD-10-CM

## 2024-10-15 DIAGNOSIS — K64.8 BLEEDING INTERNAL HEMORRHOIDS: Primary | ICD-10-CM

## 2024-10-15 PROCEDURE — 99024 POSTOP FOLLOW-UP VISIT: CPT | Performed by: STUDENT IN AN ORGANIZED HEALTH CARE EDUCATION/TRAINING PROGRAM

## 2024-10-15 PROCEDURE — 3008F BODY MASS INDEX DOCD: CPT | Performed by: STUDENT IN AN ORGANIZED HEALTH CARE EDUCATION/TRAINING PROGRAM

## 2024-10-22 ENCOUNTER — APPOINTMENT (OUTPATIENT)
Dept: SURGERY | Facility: CLINIC | Age: 49
End: 2024-10-22
Payer: COMMERCIAL

## 2024-10-23 ENCOUNTER — TELEPHONE (OUTPATIENT)
Dept: PRIMARY CARE | Facility: CLINIC | Age: 49
End: 2024-10-23
Payer: COMMERCIAL

## 2024-10-24 ENCOUNTER — OFFICE VISIT (OUTPATIENT)
Dept: PRIMARY CARE | Facility: CLINIC | Age: 49
End: 2024-10-24
Payer: COMMERCIAL

## 2024-10-24 VITALS
WEIGHT: 260 LBS | HEART RATE: 76 BPM | BODY MASS INDEX: 33.36 KG/M2 | OXYGEN SATURATION: 99 % | SYSTOLIC BLOOD PRESSURE: 132 MMHG | RESPIRATION RATE: 18 BRPM | DIASTOLIC BLOOD PRESSURE: 76 MMHG

## 2024-10-24 DIAGNOSIS — M54.42 ACUTE LEFT-SIDED LOW BACK PAIN WITH LEFT-SIDED SCIATICA: Primary | ICD-10-CM

## 2024-10-24 PROCEDURE — 99213 OFFICE O/P EST LOW 20 MIN: CPT | Performed by: INTERNAL MEDICINE

## 2024-10-24 PROCEDURE — 90471 IMMUNIZATION ADMIN: CPT | Performed by: INTERNAL MEDICINE

## 2024-10-24 PROCEDURE — 90673 RIV3 VACCINE NO PRESERV IM: CPT | Performed by: INTERNAL MEDICINE

## 2024-10-24 RX ORDER — OMEPRAZOLE 40 MG/1
40 CAPSULE, DELAYED RELEASE ORAL
COMMUNITY

## 2024-10-24 RX ORDER — METHYLPREDNISOLONE 4 MG/1
TABLET ORAL
Qty: 21 TABLET | Refills: 0 | Status: SHIPPED | OUTPATIENT
Start: 2024-10-24 | End: 2024-10-31

## 2024-10-24 RX ORDER — GABAPENTIN 300 MG/1
300 CAPSULE ORAL 3 TIMES DAILY
Qty: 90 CAPSULE | Refills: 5 | Status: SHIPPED | OUTPATIENT
Start: 2024-10-24 | End: 2025-04-22

## 2024-10-24 ASSESSMENT — ENCOUNTER SYMPTOMS
LOSS OF SENSATION IN FEET: 0
DEPRESSION: 0
OCCASIONAL FEELINGS OF UNSTEADINESS: 0

## 2024-10-24 NOTE — PROGRESS NOTES
Subjective   Patient ID: Duncan Ackerman is a 49 y.o. male who presents for Back Pain.    HPI patient is status post hemorrhoidectomy recovering 1 day before he went back to work this week he had pretty severe pain in his lower back just with standing in the driveway radiating down his left leg to the knee with some numbness no weakness no symptoms of foot drop.    Review of Systems  GI recovering from hemorrhoid surgery otherwise negative    Objective   /76 (BP Location: Right arm, Patient Position: Sitting)   Pulse 76   Resp 18   Wt 118 kg (260 lb)   SpO2 99%   BMI 33.36 kg/m²     Physical Exam  Patient has painful range of motion lumbar he has decreased right knee reflex compared to the left but present ankle reflexes are gone sensory is intact  Assessment/Plan   Diagnoses and all orders for this visit:  Acute left-sided low back pain with left-sided sciatica  This is secondary to his known L5-S1 herniated disc to the left there is also involvement on the right side in the past has responded to Medrol Dosepak and also use had epidural steroid injections and has an appointment to see pain management but not until November 15.  His MRI is markedly abnormal in the past.  We talked about possible surgery which he is against.  -     methylPREDNISolone (Medrol Dospak) 4 mg tablets; Take as directed on package.  -     gabapentin (Neurontin) 300 mg capsule; Take 1 capsule (300 mg) by mouth 3 times a day.  Start 1/day on day 1, 1 twice daily on day 2 and on day 3 1 3 times daily and that will be your dose side effects explained  Other orders  -     Flu vaccine, trivalent, preservative free, no egg protein, age 18y+ (Flublok)

## 2024-10-29 ENCOUNTER — APPOINTMENT (OUTPATIENT)
Dept: SURGERY | Facility: CLINIC | Age: 49
End: 2024-10-29
Payer: COMMERCIAL

## 2024-11-04 ASSESSMENT — ENCOUNTER SYMPTOMS
ABDOMINAL PAIN: 0
RECTAL PAIN: 1
ANAL BLEEDING: 1
DIAPHORESIS: 0
CHILLS: 0
BLOOD IN STOOL: 0
DIARRHEA: 0
DYSURIA: 0
HEMATURIA: 0
CONSTIPATION: 0
ABDOMINAL DISTENTION: 0
VOMITING: 0
DIFFICULTY URINATING: 0
NAUSEA: 0

## 2024-11-04 NOTE — PROGRESS NOTES
HPI    Duncan Ackerman is a 49 y.o. male with a hx of chronic myeloid leukemia (in remission), who was seen by DONNIE Ness for hemorrhoids. He was seen in office on 12/18/23 and found to have large internal prolapsing inflamed hemorrhoids. He is s/p excisional hemorrhoidectomy on 9/27/24. Path demonstrating squamocolumnar mucosa with hemorrhoids. He recently called the office with new pain and pressure associated with bms since the procedure. He was noted to be healing as expected at 10/25 office visit. He was recently rx methylPREDNISolone and gabapentin for sciatica pain by Dr. Donahue. He presents today for follow up.     He reports he does not think he is fully healed. A week ago he noticed blood in the toilet and minor amount of blood on toilet paper after a bm. Bleeding has gotten better since. He noticed a pulling feeling and felt a stitch last night. Denies dysuria or hematuria. Denies incontinence.     Flexible sigmoidoscopy 7/26/22 ([facility]): - Hemorrhoids.  - Non-thrombosed internal hemorrhoids and internal hemorrhoids that prolapse with straining, but require manual replacement into the anal canal (Grade III) found on perineal exam.  - Bleeding internal hemorrhoids. Banded.  -No specimens collected.     Colonoscopy 6/21/22 ([facility]): - One 2 mm polyp in the descending colon, removed with a cold biopsy forceps. Resected and retrieved.  Path fragments of tubular adenoma.   - One 5 mm polyp in the sigmoid colon, removed with a cold snare. Resected and retrieved. Path fragments of tubular adenoma.   - Non-bleeding internal hemorrhoids. Banded.   - The examination was otherwise normal.   - Repeat in 5 years for surveillance.     Non-smoker/No ETOH/No Illicit drug use  PMH: GERD, Chronic myeloid leukemia-in remission,   PSH: Bandings,   No family history of CRC or IBD  Employment: [employer]    Past Medical History:   Diagnosis Date    Abnormal EKG     Acid reflux     Back pain     Cervical radicular  pain     H/O chronic myeloid leukemia     H/O hemorrhoids     Herniated nucleus pulposus, L4-5     Left lumbar radiculopathy     Palpitations     Prolapsed internal hemorrhoids     Rectal hemorrhage     Sleep apnea        Past Surgical History:   Procedure Laterality Date    OTHER SURGICAL HISTORY  2019    Hemorrhoid rubber band ligation    OTHER SURGICAL HISTORY  2020    Foot surgery       No Known Allergies    Review of Systems   Constitutional:  Negative for chills, diaphoresis and fever.   Gastrointestinal:  Positive for anal bleeding and rectal pain. Negative for abdominal distention, abdominal pain, blood in stool, constipation, diarrhea, nausea and vomiting.   Genitourinary:  Negative for difficulty urinating, dysuria and hematuria.   All other systems reviewed and are negative.    Current Outpatient Medications on File Prior to Visit   Medication Sig Dispense Refill    esomeprazole (NexIUM) 40 mg DR capsule Take 1 capsule (40 mg) by mouth once daily. 90 capsule 3    ferrous gluconate (Fergon) 324 (38 Fe) mg tablet Take 1 tablet (38 mg of iron) by mouth once daily with breakfast.      gabapentin (Neurontin) 300 mg capsule Take 1 capsule (300 mg) by mouth 3 times a day. 90 capsule 5    ibuprofen 600 mg tablet Take 1 tablet (600 mg) by mouth every 6 hours if needed for mild pain (1 - 3).      imatinib (Gleevec) 400 mg tablet Take 1 tablet (400 mg total) by mouth once daily.      [] methylPREDNISolone (Medrol Dospak) 4 mg tablets Take as directed on package. 21 tablet 0    metoprolol succinate XL (Toprol-XL) 25 mg 24 hr tablet Take 1 tablet (25 mg) by mouth once daily. Do not crush or chew. 90 tablet 1    omeprazole (PriLOSEC) 40 mg DR capsule Take 1 capsule (40 mg) by mouth once daily.      oxyCODONE-acetaminophen (Percocet) 5-325 mg tablet Take 1 tablet by mouth every 6 hours if needed for severe pain (7 - 10). 12 tablet 0     No current facility-administered medications on file prior to  visit.       Physical Exam  Vitals reviewed. Exam conducted with a chaperone present.   Constitutional:       Appearance: Normal appearance.   HENT:      Head: Normocephalic.   Eyes:      Pupils: Pupils are equal, round, and reactive to light.   Pulmonary:      Effort: Pulmonary effort is normal. No respiratory distress.   Abdominal:      General: There is no distension.      Palpations: Abdomen is soft. There is no mass.      Tenderness: There is no abdominal tenderness.      Hernia: No hernia is present.   Genitourinary:     Comments: Left lateral hemorrhoidectomy wound C/D/I w/o visible vicryl stitch in place.  There is no significant edema/induration/fluctuance/drainage.  No evidence of infection.  Musculoskeletal:         General: No swelling or deformity. Normal range of motion.      Cervical back: Normal range of motion.      Right lower leg: No edema.      Left lower leg: No edema.   Skin:     General: Skin is warm and dry.      Capillary Refill: Capillary refill takes less than 2 seconds.   Neurological:      General: No focal deficit present.      Mental Status: He is alert and oriented to person, place, and time. Mental status is at baseline.   Psychiatric:         Mood and Affect: Mood normal.         Behavior: Behavior normal.         Thought Content: Thought content normal.         Judgment: Judgment normal.       Assessment and Plan:   #Prolapsing left lateral internal hemorrhoid and large left lateral external hemorrhoid S/P hemorrhoidectomy 9/27/2024; also had moderate anterior internal hemorrhoid suture ligated  #CML on chronic imatinib therapy  -  Hemorrhoidectomy wound appears to be healing appropriately without evidence of dehiscence/infection  -  No new hemorrhoids externally or prolapsing internal hemorrhoids    Duncan Carter MD   11/6/2024  12:48 PM

## 2024-11-06 ENCOUNTER — APPOINTMENT (OUTPATIENT)
Dept: SURGERY | Facility: CLINIC | Age: 49
End: 2024-11-06
Payer: COMMERCIAL

## 2024-11-06 VITALS
HEIGHT: 74 IN | SYSTOLIC BLOOD PRESSURE: 115 MMHG | BODY MASS INDEX: 34.78 KG/M2 | DIASTOLIC BLOOD PRESSURE: 78 MMHG | HEART RATE: 63 BPM | WEIGHT: 271 LBS | TEMPERATURE: 98.1 F

## 2024-11-06 DIAGNOSIS — K64.8 PROLAPSED INTERNAL HEMORRHOIDS: Primary | ICD-10-CM

## 2024-11-06 PROCEDURE — 99211 OFF/OP EST MAY X REQ PHY/QHP: CPT | Performed by: STUDENT IN AN ORGANIZED HEALTH CARE EDUCATION/TRAINING PROGRAM

## 2024-11-06 PROCEDURE — 3008F BODY MASS INDEX DOCD: CPT | Performed by: STUDENT IN AN ORGANIZED HEALTH CARE EDUCATION/TRAINING PROGRAM

## 2024-11-06 ASSESSMENT — ENCOUNTER SYMPTOMS: FEVER: 0

## 2024-11-12 ENCOUNTER — OFFICE VISIT (OUTPATIENT)
Dept: PAIN MEDICINE | Facility: HOSPITAL | Age: 49
End: 2024-11-12
Payer: COMMERCIAL

## 2024-11-12 DIAGNOSIS — M54.16 LUMBAR RADICULOPATHY: ICD-10-CM

## 2024-11-12 PROCEDURE — 99214 OFFICE O/P EST MOD 30 MIN: CPT | Performed by: ANESTHESIOLOGY

## 2024-11-12 PROCEDURE — 99204 OFFICE O/P NEW MOD 45 MIN: CPT | Performed by: ANESTHESIOLOGY

## 2024-11-12 ASSESSMENT — PAIN SCALES - GENERAL: PAINLEVEL_OUTOF10: 7

## 2024-11-18 NOTE — PROGRESS NOTES
"Chief Complaint   Patient presents with    Back Pain    Extremity Pain        NATALIA Song is a 49-year-old male with a history of back pain.  He was last seen in 2018 at which time he was diagnosed with degenerative disc disease and underwent an L5-S1 epidural.  Patient had right sided pain in the past.  He says that at this time he has been struggling with chronic low back pain.  He says that he feels soreness in the left leg posteriorly which goes all the way down to the foot and ankle.  The predominant area of pain is across the \"beltline.\"  The patient says that he has a chronic daily pain but things can worsen if he sneezes, sits, bends, lifts.  He has tried gabapentin and prednisone.  He notes that the prior epidural done in October 2018 he did gave him more than 90% relief and lasted over 2 years.  Of note, he did have 2 close injections at that time to get him the relief that he was looking for.    Patient does have a history of physical therapy for his back and does a physician directed exercises strengthening several times a week.    ROS: 13 point review of systems is complete and is negative listed above in HPI    Past Medical History:   Diagnosis Date    Abnormal EKG     Acid reflux     Back pain     Cervical radicular pain     H/O chronic myeloid leukemia     H/O hemorrhoids     Herniated nucleus pulposus, L4-5     Left lumbar radiculopathy     Palpitations     Prolapsed internal hemorrhoids     Rectal hemorrhage     Sleep apnea        Past Surgical History:   Procedure Laterality Date    OTHER SURGICAL HISTORY  12/23/2019    Hemorrhoid rubber band ligation    OTHER SURGICAL HISTORY  09/25/2020    Foot surgery       Family History   Problem Relation Name Age of Onset    Other (cardiac pacemaker) Other aunt        Social History     Tobacco Use    Smoking status: Never    Smokeless tobacco: Never   Vaping Use    Vaping status: Never Used   Substance Use Topics    Alcohol use: Not Currently    Drug use: " Never       Current Outpatient Medications on File Prior to Visit   Medication Sig Dispense Refill    esomeprazole (NexIUM) 40 mg DR capsule Take 1 capsule (40 mg) by mouth once daily. 90 capsule 3    ferrous gluconate (Fergon) 324 (38 Fe) mg tablet Take 1 tablet (38 mg of iron) by mouth once daily with breakfast.      gabapentin (Neurontin) 300 mg capsule Take 1 capsule (300 mg) by mouth 3 times a day. 90 capsule 5    ibuprofen 600 mg tablet Take 1 tablet (600 mg) by mouth every 6 hours if needed for mild pain (1 - 3).      imatinib (Gleevec) 400 mg tablet Take 1 tablet (400 mg total) by mouth once daily.      metoprolol succinate XL (Toprol-XL) 25 mg 24 hr tablet Take 1 tablet (25 mg) by mouth once daily. Do not crush or chew. 90 tablet 1    [DISCONTINUED] omeprazole (PriLOSEC) 40 mg DR capsule Take 1 capsule (40 mg) by mouth once daily. (Patient not taking: Reported on 11/6/2024)      [DISCONTINUED] oxyCODONE-acetaminophen (Percocet) 5-325 mg tablet Take 1 tablet by mouth every 6 hours if needed for severe pain (7 - 10). (Patient not taking: Reported on 11/6/2024) 12 tablet 0     No current facility-administered medications on file prior to visit.        No Known Allergies       Imaging:  Narrative & Impression   MRN: [id number]  Patient Name: PATRICIA GARCIA     STUDY:  NR MR L-SPINE WO/W CONTRAST; 8/1/2019 4:51 pm     INDICATION:  CHRONIC LOW BACK PAIN THT RADIATES TO BUTTOCK.     COMPARISON:  None.     ACCESSION NUMBER(S):  [id number]     ORDERING CLINICIAN:  PANKAJ REHMAN     TECHNIQUE:  Sagittal T1, T2, STIR, axial T1 and T2 weighted images of the lumbar  spine were acquired before contrast followed by postcontrast imaging  after the intravenous administration of gadolinium based contrast  agent.     FINDINGS:  Height alignment and signal of the lumbar vertebral bodies is  preserved. Conus medullaris terminates appropriately at approximately  the T12-L1 level. There is disc desiccation most prominently at  L5-S1  where there is minimal loss of intervertebral disc space height.  There does appear to be some degree of congenital pedicular stenosis  contributing to spinal canal narrowing throughout the lumbar region.  There is a fat containing lesion probably intramuscular lipoma  incidentally noted in the soft tissues of the right lower back on  image 11 of axial T1 weighted sequence measuring up to 3.8 cm  transverse. Incidental note is made of a T1 and T2 hypointense  apparently sclerotic focus in the right iliac bone which is  nonspecific and could reflect a bone island for instance with this  area did not clearly visualized on the prior examination with minimal  difference in technique.     L3-4: Minimal disc bulge without significant spinal canal or neural  foraminal stenosis.  L4-5: Minimal disc bulge without significant spinal canal or neural  foraminal stenosis.  L5-S1: Central and left subarticular disc herniation partly effaces  the left subarticular zone in combination with hypertrophic facet  changes and ligamentum flavum hypertrophy abutting and potentially at  least minimally compressing the traversing left S1 nerve root at this  level.     On postcontrast images there may be subtle enhancement of the  traversing left S1 nerve root at the L5-S1 level and perhaps subtle  enhancement of the left S2 nerve root versus venous vascular  prominence.     IMPRESSION:  Degenerative changes of the lumbar spine as above described without  overall significant change compared to the prior evaluation of  05/25/2011 with persistent central left paracentral herniation at  L5-S1 probably compressing the traversing left S1 nerve root  potentially also with minimal impingement of the traversing left S2  nerve root and additional findings as above. Equivocal enhancement of  the left S1-S2 nerve roots at the L5-S1 level could be on the basis  of compressive neuropathy for instance. Please correlate clinically.       Physical  Exam:  Gen.: No acute distress  Eyes: Pupils are symmetric   ENT: Hearing intact  Respiratory: No gasping or shortness of breath throughout exam  Cardiovascular: Extremity show no edema or varicosities  Skin: no rashes or open lesions or ulcers identified on the skin  Musculoskeletal: Gait is grossly normal, positive SLR on left side only, intact strength and sensation bilateral lower EXTR, normal reflex  Neurologic: Cranial nerves II through XII are grossly intact  Psychiatric:  Patient is alert and oriented x3    Impression/Plan:  49-year-old male with a history of back pain and left-sided radicular symptoms who presents today for evaluation.  He was last seen 6 years ago and underwent epidural that gave him more than 2 years relief.  He says that recently the pain has worsened to a degree where he would want to seek further care.  Prior MRI showed degenerative disc disease most significant at L5-S1.    -Will plan for repeat L5-S1 intralaminar.  Procedure, risk, benefits, alternatives reviewed with the patient.    -Physical therapy to be restarted formally.  Physician directed exercises given and instructed to do at least 2-3 times a week.    -Information on In[facility].  Discussed with the patient we do not see relief with the aforementioned measures would get updated MRI.  Most of his pain is in the back.

## 2024-12-06 ENCOUNTER — HOSPITAL ENCOUNTER (OUTPATIENT)
Facility: HOSPITAL | Age: 49
Discharge: HOME | End: 2024-12-06
Payer: COMMERCIAL

## 2024-12-06 VITALS
SYSTOLIC BLOOD PRESSURE: 141 MMHG | RESPIRATION RATE: 18 BRPM | DIASTOLIC BLOOD PRESSURE: 75 MMHG | HEART RATE: 66 BPM | TEMPERATURE: 98.1 F | OXYGEN SATURATION: 99 %

## 2024-12-06 DIAGNOSIS — M54.16 LUMBAR RADICULOPATHY: ICD-10-CM

## 2024-12-06 PROCEDURE — 2500000004 HC RX 250 GENERAL PHARMACY W/ HCPCS (ALT 636 FOR OP/ED): Performed by: ANESTHESIOLOGY

## 2024-12-06 PROCEDURE — 2550000001 HC RX 255 CONTRASTS: Performed by: ANESTHESIOLOGY

## 2024-12-06 PROCEDURE — 62323 NJX INTERLAMINAR LMBR/SAC: CPT | Performed by: ANESTHESIOLOGY

## 2024-12-06 RX ORDER — METHYLPREDNISOLONE ACETATE 40 MG/ML
INJECTION, SUSPENSION INTRA-ARTICULAR; INTRALESIONAL; INTRAMUSCULAR; SOFT TISSUE
Status: COMPLETED | OUTPATIENT
Start: 2024-12-06 | End: 2024-12-06

## 2024-12-06 RX ORDER — LIDOCAINE HYDROCHLORIDE 5 MG/ML
INJECTION, SOLUTION INFILTRATION; INTRAVENOUS
Status: DISCONTINUED
Start: 2024-12-06 | End: 2024-12-07 | Stop reason: HOSPADM

## 2024-12-06 RX ORDER — METHYLPREDNISOLONE ACETATE 40 MG/ML
INJECTION, SUSPENSION INTRA-ARTICULAR; INTRALESIONAL; INTRAMUSCULAR; SOFT TISSUE
Status: DISCONTINUED
Start: 2024-12-06 | End: 2024-12-07 | Stop reason: HOSPADM

## 2024-12-06 RX ORDER — LIDOCAINE HYDROCHLORIDE 5 MG/ML
INJECTION, SOLUTION INFILTRATION; INTRAVENOUS
Status: COMPLETED | OUTPATIENT
Start: 2024-12-06 | End: 2024-12-06

## 2024-12-06 ASSESSMENT — PAIN - FUNCTIONAL ASSESSMENT
PAIN_FUNCTIONAL_ASSESSMENT: 0-10
PAIN_FUNCTIONAL_ASSESSMENT: WONG-BAKER FACES
PAIN_FUNCTIONAL_ASSESSMENT: WONG-BAKER FACES
PAIN_FUNCTIONAL_ASSESSMENT: 0-10

## 2024-12-06 ASSESSMENT — COLUMBIA-SUICIDE SEVERITY RATING SCALE - C-SSRS
6. HAVE YOU EVER DONE ANYTHING, STARTED TO DO ANYTHING, OR PREPARED TO DO ANYTHING TO END YOUR LIFE?: NO
1. IN THE PAST MONTH, HAVE YOU WISHED YOU WERE DEAD OR WISHED YOU COULD GO TO SLEEP AND NOT WAKE UP?: NO
2. HAVE YOU ACTUALLY HAD ANY THOUGHTS OF KILLING YOURSELF?: NO

## 2024-12-06 ASSESSMENT — PAIN SCALES - GENERAL
PAINLEVEL_OUTOF10: 0 - NO PAIN
PAINLEVEL_OUTOF10: 4
PAINLEVEL_OUTOF10: 0 - NO PAIN
PAINLEVEL_OUTOF10: 0 - NO PAIN

## 2024-12-06 NOTE — DISCHARGE INSTRUCTIONS
DISCHARGE INSTRUCTIONS FOR INJECTIONS     You underwent Lumbar Inter-Laminar Epidural Steroid Injection at the L5-S1 level today    After most injections, it is recommended that you relax and limit your activity for the remainder of the day unless you have been told otherwise by your pain physician.  You should not drive a car, operate machinery, or make important legal decisions unless otherwise directed by your pain physician.  You may resume your normal activity, including exercise, tomorrow.      Keep a written pain diary of how much pain relief you experienced following the injection procedure and the length of time of pain relief you experienced pain relief. Following diagnostic injections like medial branch nerve blocks, sacroiliac joint blocks, stellate ganglion injections and other blocks, it is very important you record the specific amount of pain relief you experienced immediately after the injectionand how long it lasted. Your doctor will ask you for this information at your follow up visit.     For all injections, please keep the injection site dry and inspect the site for a couple of days. You may remove the Band-Aid the day of the injection at any time.     Some discomfort, bruising or slight swelling may occur at the injection site. This is not abnormal if it occurs.  If needed you may:    -Take over the counter medication such as Tylenol or Motrin.   -Apply an ice pack for 30 minutes, 2 to 3 times a day for the first 24 hours.     You may shower today; no soaking baths, hot tubs, whirlpools or swimming pools for two days.      If you are given steroids in your injection, it may take 3-5 days for the steroid medication to take effect. You may notice a worsening of your symptoms for 1-2 days after the injection. This is not abnormal.  You may use acetaminophen, ibuprofen, or prescription medication that your doctor may have prescribed for you if you need to do so.     A few common side effects of  steroids include facial flushing, sweating, restlessness, irritability,difficulty sleeping, increase in blood sugar, and increased blood pressure. If you have diabetes, please monitor your blood sugar at least once a day for at least 5 days. If you have poorly controlled high blood pressure, monitoryour blood pressure for at least 2 days and contact your primary care physician if these numbers are unusually high for you.      If you take aspirin or non-steroidal anti-inflammatory drugs (examples are Motrin, Advil, ibuprofen, Naprosyn, Voltaren, Relafen, etc.) you may restart these this evening, but stop taking it 3 days before your next appointment, unless instructed otherwiseby your physician.      You do not need to discontinue non-aspirin-containing pain medications prior to an injection (examples: Celebrex, tramadol, hydrocodone and acetaminophen).      If you take a blood thinning medication (Coumadin, Lovenox, Fragmin,Ticlid, Plavix, Pradaxa, etc.), please discuss this with your primary care physician/cardiologist and your pain physician. These medications MUST be discontinued before you can have an injection safely, without the risk of uncontrolled bleeding. If these medications are not discontinued for an appropriate period of time, you will not be able to receivean injection. Please adhere to instructions given to you about when to restart your blood thinning medication. If you have any questions please reach out to our team.    If you are taking Coumadin, please have your INR checked the morning of your procedure and bring the result to your appointment unless otherwise instructed. If your INR is over 1.2, your injection may need to be rescheduled to avoid uncontrolled bleeding from the needle placement.     Call [facility]  and ask for Pain Management at [phone] between 8am-4pm Monday - Friday if you are experiencing the following:    If you received an epidural or spinal injection:    -Headache that  doesnot go away with medicine, is worse when sitting or standing up, and is greatly relieved upon lying down.   -Severe pain worse than or different than your baseline pain.   -Chills or fever (101º F or greater).   -Drainage or signs of infection at the injection site     Go directly to the Emergency Department if you are experiencing the following and received an epidural or spinal injection:   -Abrupt weakness or progressive weakness in your legs that starts after you leave the clinic.   -Abrupt severe or worsening numbness in your legs.   -Inability to urinate after the injection or loss of bowel or bladder control without the urge to defecate or urinate.     If you have a clinical question that cannot wait until your next appointment, please call [phone] between 8am-4pm Monday - Friday or send a [employer] message. We do our best to return all non-emergency messages within 24 hours, Monday - Friday. A nurse or physician will return your message. You may also the appropriate nurse below, and they will do their best to answer your questions.  - [place]. [place]son, [place] [facility] [place] [phone]  - For Dr. Valle, call [facility] at [phone]  - For Dr. Russell, call Cynthia at [phone]  - For Dr. Collazo, call [facility] at [phone]  - For Dr. Guadarrama, call [facility] at [phone]    If you need to cancel an appointment, please call the scheduling staff at [phone] during normal business hours or leave a message at least 24 hours in advance.     If you are going to be sedated for your next procedure, you MUST have responsible adult who can legally drive accompany you home. You cannot eat or drink for at least eight hours prior to the planned procedure if you are going to receive sedation. You may take your non-blood thinning medications with a small sip of water.

## 2024-12-06 NOTE — H&P
HISTORY AND PHYSICAL    History Of Present Illness  Duncan Ackerman is a 49 y.o. male presenting with chronic pain here for procedure as stated below. Patient denies any changes to health since the last visit to our clinic.    Past Medical History  Past Medical History:   Diagnosis Date    Abnormal EKG     Acid reflux     Back pain     Cervical radicular pain     H/O chronic myeloid leukemia     H/O hemorrhoids     Herniated nucleus pulposus, L4-5     Left lumbar radiculopathy     Palpitations     Prolapsed internal hemorrhoids     Rectal hemorrhage     Sleep apnea        Surgical History  Past Surgical History:   Procedure Laterality Date    OTHER SURGICAL HISTORY  12/23/2019    Hemorrhoid rubber band ligation    OTHER SURGICAL HISTORY  09/25/2020    Foot surgery        Social History  He reports that he has never smoked. He has never used smokeless tobacco. He reports that he does not currently use alcohol. He reports that he does not use drugs.    Family History  Family History   Problem Relation Name Age of Onset    Other (cardiac pacemaker) Other aunt         Allergies  Patient has no known allergies.    Review of Systems  12 point ROS done and negative except for the above.     Physical Exam  General: NAD, well groomed, well nourished  Eyes: Non-icteric sclera, EOMI  Ears, Nose, Mouth, and Throat: External ears and nose appear to be without deformity or rash. No lesions or masses noted. Hearing is grossly intact.   Neck: Trachea midline  Respiratory: Nonlabored breathing   Cardiovascular: No peripheral edema   Skin: No rashes or open lesions/ulcers identified on skin.      Last Recorded Vitals  There were no vitals taken for this visit.    Relevant Results  Current Outpatient Medications   Medication Instructions    esomeprazole (NEXIUM) 40 mg, oral, Daily    ferrous gluconate (Fergon) 324 (38 Fe) mg tablet 38 mg of iron, Daily with breakfast    gabapentin (NEURONTIN) 300 mg, oral, 3 times daily    ibuprofen  600 mg, oral, Every 6 hours PRN    imatinib (Gleevec) 400 mg tablet 1 tablet, Daily    metoprolol succinate XL (TOPROL-XL) 25 mg, oral, Daily, Do not crush or chew.       No results found for this or any previous visit from the past 1000 days.     No image results found.     1. Lumbar radiculopathy  FL pain management    FL pain management    Epidural Steroid Injection    Epidural Steroid Injection              Assessment/Plan   Duncan Ackerman is a 49 y.o. male presenting with chronic pain here for Lumbar Inter-Laminar Epidural Steroid Injection at the L5-S1 level; he denies any recent antibiotic use or infections, he denies any blood thinner use, and he denies contrast or local anesthetic allergies.    ASA PS Classification: 2  Mallampati score: 2    Plan:  - We will proceed with the procedure as above. We discussed extensively the risks, benefits, and alternatives to the procedure. The patient's questions were addressed and answered in detail. The patient demonstrated understanding of the procedure, and is amenable to proceeding with it. The Risks of the procedure that were discussed with the patient include but are not limited to the following: A lack of efficacy, transient worsening of pain, bleeding, infection, nerve injury, nerve damage, neuritis or sunburn sensation. Informed consent obtained as attached to EMR.  - Patient to continue physician directed home exercises as tolerated.  - Patient will follow-up with us in clinic.      Stephanie Mohr MD  Chronic Pain Fellow  [facility]

## 2024-12-13 ENCOUNTER — EVALUATION (OUTPATIENT)
Dept: PHYSICAL THERAPY | Facility: CLINIC | Age: 49
End: 2024-12-13
Payer: COMMERCIAL

## 2024-12-13 DIAGNOSIS — M54.16 LUMBAR RADICULOPATHY: ICD-10-CM

## 2024-12-13 PROCEDURE — 97161 PT EVAL LOW COMPLEX 20 MIN: CPT | Mod: GP | Performed by: PHYSICAL THERAPIST

## 2024-12-13 PROCEDURE — 97535 SELF CARE MNGMENT TRAINING: CPT | Mod: GP | Performed by: PHYSICAL THERAPIST

## 2024-12-13 SDOH — ECONOMIC STABILITY: FOOD INSECURITY: WITHIN THE PAST 12 MONTHS, THE FOOD YOU BOUGHT JUST DIDN'T LAST AND YOU DIDN'T HAVE MONEY TO GET MORE.: NEVER TRUE

## 2024-12-13 SDOH — ECONOMIC STABILITY: FOOD INSECURITY: WITHIN THE PAST 12 MONTHS, YOU WORRIED THAT YOUR FOOD WOULD RUN OUT BEFORE YOU GOT MONEY TO BUY MORE.: NEVER TRUE

## 2024-12-13 ASSESSMENT — ENCOUNTER SYMPTOMS
DEPRESSION: 0
OCCASIONAL FEELINGS OF UNSTEADINESS: 0
LOSS OF SENSATION IN FEET: 0

## 2024-12-13 NOTE — PROGRESS NOTES
Physical Therapy Evaluation    Patient Name: Duncan Ackerman  MRN: [id number]  Today's Date: 12/13/2024  Referred by: Dr. Paulson  Visit: 1/90    Authorization or Plan of Care Date:   Time Calculation  Start Time: 0710  Stop Time: 0800  Time Calculation (min): 50 min  Diagnosis:  1. Lumbar radiculopathy  Referral to Physical Therapy    Follow Up In Physical Therapy      PRECAUTIONS:   Fall Risk: None    SUBJECTIVE:  Patient with history of LBP, MRI in the past showed degenerative changes and L5-S1 HNP, had injection 5 years ago with good relief, recent injection on 12/6 with minimal improvement to this point, reports constant pain across belt line, occasional LLE pain  Pain:  2-6/10 LBP  Home Living:  Multi story home with wife, no issues  Prior level of function:  Chronic LBP  Personal Factors Impacting Care:  None    OBJECTIVE:  Lumbar AROM: (% movement)   Flexion 50   Extension 100   Right Sidebend 75   Left Sidebend 75   Right Rotation 75   Left Rotation 75     Lumbar Repetitive Movement Response   Flexion in standing   increased   Extension in standing   increased   Right Sideglide    Left Sideglide    Flexion in supine   no change   Extension in prone   increased     Hip/core Strength:  Weak and painless  Palpation:  -TTP across L/S  Dermatomal Impairment:  none  Myotomal Impairment:  none  Functional Outcome Measure:  Oswestry 12%    ASSESSMENT:  Patient presents with signs of axial LBP, no radicular pain at the time of evaluation, symptoms appear related to L/S overload from poor core strength and inflexibility, patient with increased lumbar lordosis, discussed importance of consistent exercise program going forward    Patient presents with the following deficits/problems that indicate the need for skilled PT: LBP, core weakness.    Low complexity due to patient's clinical presentation being stable and uncomplicated by any significant comorbidities that may affect rehab tolerance and progression.      Clinical presentation:  Stable and/or uncomplicated characteristics    TREATMENT:  Initial evaluation performed followed by discussion of findings and instruction in HEP.    PATIENT EDUCATION:  Access Code: [email or web address]  URL: [email or web address]  Date: 12/13/2024  Prepared by: Fran Tovar    Exercises  - Supine Lower Trunk Rotation  - 1 x daily - 7 x weekly - 1 sets - 10 reps - 5 hold  - Supine Piriformis Stretch with Foot on Ground  - 1 x daily - 7 x weekly - 1 sets - 2 reps - 30 hold  - Supine Hamstring Stretch  - 1 x daily - 7 x weekly - 2 reps - 30 hold  - Seated Lumbar Flexion Stretch  - 1 x daily - 7 x weekly - 1 sets - 2 reps - 30 hold  - Supine Pelvic Tilt with Straight Leg Raise  - 1 x daily - 7 x weekly - 2 sets - 10 reps  - Bridge on Heels  - 1 x daily - 7 x weekly - 2 sets - 10 reps  - Sidelying Hip Abduction  - 1 x daily - 7 x weekly - 2 sets - 10 reps  - Kneeling Plank with Feet on Ground  - 1 x daily - 7 x weekly - 1 sets - 5 reps - 30 hold    PLAN:   HEP daily, follow up in several weeks  Rehab potential:  Good  Plan of care agreement  Patient    GOALS:  Active       PT Problem       Decrease c/o LBP to 3/10 at worst       Start:  12/13/24    Expected End:  02/07/25            Independent with HEP       Start:  12/13/24    Expected End:  02/07/25            Patient Stated Goal: relieve LBP       Start:  12/13/24    Expected End:  02/07/25

## 2024-12-14 DIAGNOSIS — M54.42 ACUTE LEFT-SIDED LOW BACK PAIN WITH LEFT-SIDED SCIATICA: ICD-10-CM

## 2024-12-16 RX ORDER — METHYLPREDNISOLONE 4 MG/1
TABLET ORAL
Qty: 21 EACH | OUTPATIENT
Start: 2024-12-16

## 2024-12-30 ENCOUNTER — LAB (OUTPATIENT)
Dept: LAB | Facility: LAB | Age: 49
End: 2024-12-30
Payer: COMMERCIAL

## 2024-12-30 ENCOUNTER — APPOINTMENT (OUTPATIENT)
Dept: PRIMARY CARE | Facility: CLINIC | Age: 49
End: 2024-12-30
Payer: COMMERCIAL

## 2024-12-30 VITALS
HEIGHT: 74 IN | BODY MASS INDEX: 35.28 KG/M2 | SYSTOLIC BLOOD PRESSURE: 112 MMHG | HEART RATE: 70 BPM | OXYGEN SATURATION: 95 % | RESPIRATION RATE: 16 BRPM | WEIGHT: 274.91 LBS | DIASTOLIC BLOOD PRESSURE: 70 MMHG

## 2024-12-30 DIAGNOSIS — Z12.5 PROSTATE CANCER SCREENING: ICD-10-CM

## 2024-12-30 DIAGNOSIS — M54.16 LUMBAR RADICULITIS: ICD-10-CM

## 2024-12-30 DIAGNOSIS — Z12.5 PROSTATE CANCER SCREENING: Primary | ICD-10-CM

## 2024-12-30 LAB — PSA SERPL-MCNC: 1.12 NG/ML

## 2024-12-30 PROCEDURE — 84153 ASSAY OF PSA TOTAL: CPT

## 2024-12-30 PROCEDURE — 1036F TOBACCO NON-USER: CPT | Performed by: INTERNAL MEDICINE

## 2024-12-30 PROCEDURE — 99213 OFFICE O/P EST LOW 20 MIN: CPT | Performed by: INTERNAL MEDICINE

## 2024-12-30 PROCEDURE — 3008F BODY MASS INDEX DOCD: CPT | Performed by: INTERNAL MEDICINE

## 2024-12-30 RX ORDER — PREDNISONE 10 MG/1
TABLET ORAL
Qty: 30 TABLET | Refills: 0 | Status: SHIPPED | OUTPATIENT
Start: 2024-12-30 | End: 2025-01-09

## 2024-12-30 ASSESSMENT — PATIENT HEALTH QUESTIONNAIRE - PHQ9
2. FEELING DOWN, DEPRESSED OR HOPELESS: NOT AT ALL
SUM OF ALL RESPONSES TO PHQ9 QUESTIONS 1 AND 2: 0
1. LITTLE INTEREST OR PLEASURE IN DOING THINGS: NOT AT ALL

## 2024-12-30 ASSESSMENT — ENCOUNTER SYMPTOMS
DEPRESSION: 0
OCCASIONAL FEELINGS OF UNSTEADINESS: 0
LOSS OF SENSATION IN FEET: 0

## 2024-12-30 ASSESSMENT — COLUMBIA-SUICIDE SEVERITY RATING SCALE - C-SSRS
2. HAVE YOU ACTUALLY HAD ANY THOUGHTS OF KILLING YOURSELF?: NO
1. IN THE PAST MONTH, HAVE YOU WISHED YOU WERE DEAD OR WISHED YOU COULD GO TO SLEEP AND NOT WAKE UP?: NO
6. HAVE YOU EVER DONE ANYTHING, STARTED TO DO ANYTHING, OR PREPARED TO DO ANYTHING TO END YOUR LIFE?: NO

## 2024-12-30 NOTE — PROGRESS NOTES
"Subjective   Patient ID: Duncan Ackerman is a 49 y.o. male who presents for Follow-up.    HPI 49-year-old male follow-up for left sciatica and improvement with Medrol Dosepak also had 1 epidural steroid injection without much help now symptoms are coming back mainly pain in the left lower back left leg no numbness or weakness.  Previous abnormal MRI demonstrating herniated disc to the left in 2019.  This is recurrence of same problem    Review of Systems    Objective   /70   Pulse 70   Resp 16   Ht 1.88 m (6' 2\")   Wt 125 kg (274 lb 14.6 oz)   SpO2 95%   BMI 35.30 kg/m²     Physical Exam  Cardiovascular:      Rate and Rhythm: Regular rhythm.   Neurological:      Mental Status: He is alert.      Motor: No weakness.      Deep Tendon Reflexes: Reflexes abnormal.   Psychiatric:         Mood and Affect: Mood normal.         Assessment/Plan   Diagnoses and all orders for this visit:  Prostate cancer screening  -     PSA; Future  Lumbar radiculitis  -Side effects explained  Follow-up 1 month  Patient is contemplating ablation  Surgery is an option  Continue with PT  -     predniSONE (Deltasone) 10 mg tablet; Take 5 tablets (50 mg) by mouth once daily for 2 days, THEN 4 tablets (40 mg) once daily for 2 days, THEN 3 tablets (30 mg) once daily for 2 days, THEN 2 tablets (20 mg) once daily for 2 days, THEN 1 tablet (10 mg) once daily for 2 days.       "

## 2024-12-30 NOTE — PROGRESS NOTES
"Subjective   Patient ID: Duncan Ackerman is a 49 y.o. male who presents for Follow-up.    HPI     Review of Systems    Objective   /70   Pulse 70   Resp 16   Ht 1.88 m (6' 2\")   Wt 125 kg (274 lb 14.6 oz)   SpO2 95%   BMI 35.30 kg/m²     Physical Exam    Assessment/Plan          "

## 2025-01-15 ENCOUNTER — APPOINTMENT (OUTPATIENT)
Dept: PHYSICAL THERAPY | Facility: CLINIC | Age: 50
End: 2025-01-15
Payer: COMMERCIAL

## 2025-01-17 DIAGNOSIS — K21.9 GASTROESOPHAGEAL REFLUX DISEASE, UNSPECIFIED WHETHER ESOPHAGITIS PRESENT: ICD-10-CM

## 2025-01-18 RX ORDER — ESOMEPRAZOLE MAGNESIUM 40 MG/1
40 CAPSULE, DELAYED RELEASE ORAL DAILY
Qty: 90 CAPSULE | Refills: 3 | Status: SHIPPED | OUTPATIENT
Start: 2025-01-18

## 2025-02-03 ENCOUNTER — APPOINTMENT (OUTPATIENT)
Dept: PRIMARY CARE | Facility: CLINIC | Age: 50
End: 2025-02-03
Payer: COMMERCIAL

## 2025-02-03 VITALS
HEIGHT: 74 IN | DIASTOLIC BLOOD PRESSURE: 80 MMHG | HEART RATE: 79 BPM | WEIGHT: 273 LBS | BODY MASS INDEX: 35.04 KG/M2 | SYSTOLIC BLOOD PRESSURE: 126 MMHG | OXYGEN SATURATION: 97 %

## 2025-02-03 DIAGNOSIS — C92.10 CHRONIC MYELOID LEUKEMIA (MULTI): ICD-10-CM

## 2025-02-03 DIAGNOSIS — M54.32 SCIATICA OF LEFT SIDE: Primary | ICD-10-CM

## 2025-02-03 DIAGNOSIS — G47.33 OBSTRUCTIVE SLEEP APNEA OF ADULT: ICD-10-CM

## 2025-02-03 PROCEDURE — 99214 OFFICE O/P EST MOD 30 MIN: CPT | Performed by: INTERNAL MEDICINE

## 2025-02-03 PROCEDURE — 3008F BODY MASS INDEX DOCD: CPT | Performed by: INTERNAL MEDICINE

## 2025-02-03 RX ORDER — PREDNISONE 10 MG/1
TABLET ORAL
Qty: 30 TABLET | Refills: 0 | Status: SHIPPED | OUTPATIENT
Start: 2025-02-03 | End: 2025-02-13

## 2025-02-03 ASSESSMENT — PATIENT HEALTH QUESTIONNAIRE - PHQ9
SUM OF ALL RESPONSES TO PHQ9 QUESTIONS 1 AND 2: 0
2. FEELING DOWN, DEPRESSED OR HOPELESS: NOT AT ALL
1. LITTLE INTEREST OR PLEASURE IN DOING THINGS: NOT AT ALL

## 2025-02-03 ASSESSMENT — ENCOUNTER SYMPTOMS
DEPRESSION: 0
LOSS OF SENSATION IN FEET: 0
OCCASIONAL FEELINGS OF UNSTEADINESS: 0

## 2025-02-03 NOTE — PROGRESS NOTES
Subjective   Patient ID: Duncan Ackerman is a 49 y.o. male who presents for Follow-up.    HPI   49-year-old comes today for follow-up for left-sided generally better he lost his prescription prednisone taper never took it felt better any rate.  Would like to have a refill to use in case things recur.  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    Physical Exam blood pressure 110/80 heart rate 70 and regular respirations 16  Lungs clear to auscultation  Heart regular rate rhythm gallop rub or murmur  Abdomen negative  Musculoskeletal gait normal    Assessment/Plan   Diagnoses and all orders for this visit:  Sciatica of left side  - Today we talked about his recurrent back and left leg pain.  It certainly gets worse with certain types of activity and I suggest he try to avoid heavy lifting.  If it recurs he can start the prednisone.  We can use this maybe every 4 months.  I think he needs weight loss.  He was interested in the GLP-1 type medications we talked about Zepbound given his sleep apnea.  He will think about it talk to his wife.  He did check with his leukemia doctor who felt it would be okay  -     predniSONE (Deltasone) 10 mg tablet; Take 5 tablets (50 mg) by mouth once daily for 2 days, THEN 4 tablets (40 mg) once daily for 2 days, THEN 3 tablets (30 mg) once daily for 2 days, THEN 2 tablets (20 mg) once daily for 2 days, THEN 1 tablet (10 mg) once daily for 2 days.  Chronic myeloid leukemia (Multi)  - In remission  Obstructive sleep apnea of adult  - Would benefit from weight loss continue with CPAP follow-up with sleep medicine    Follow-up 3 months

## 2025-03-27 ENCOUNTER — DOCUMENTATION (OUTPATIENT)
Dept: PHYSICAL THERAPY | Facility: CLINIC | Age: 50
End: 2025-03-27
Payer: COMMERCIAL

## 2025-03-27 DIAGNOSIS — M54.16 LUMBAR RADICULOPATHY: ICD-10-CM

## 2025-03-27 NOTE — PROGRESS NOTES
Physical Therapy    Discharge Summary    Name: Duncan Ackerman  MRN: [id number]  : 1975  Date: 25    Discharge Summary: PT    Discharge Information: Date of discharge 3/27/25 and Date of last visit 24    Therapy Summary: Pt has been discharged from Physical Therapy Services. Please see last treatment note for details.

## 2025-04-09 DIAGNOSIS — I10 HTN (HYPERTENSION), BENIGN: ICD-10-CM

## 2025-04-09 RX ORDER — METOPROLOL SUCCINATE 25 MG/1
25 TABLET, EXTENDED RELEASE ORAL DAILY
Qty: 90 TABLET | Refills: 1 | Status: SHIPPED | OUTPATIENT
Start: 2025-04-09

## 2025-05-05 ENCOUNTER — APPOINTMENT (OUTPATIENT)
Dept: PRIMARY CARE | Facility: CLINIC | Age: 50
End: 2025-05-05
Payer: COMMERCIAL

## 2025-05-09 ENCOUNTER — APPOINTMENT (OUTPATIENT)
Dept: PRIMARY CARE | Facility: CLINIC | Age: 50
End: 2025-05-09
Payer: COMMERCIAL

## 2025-05-09 VITALS
HEIGHT: 74 IN | WEIGHT: 269 LBS | OXYGEN SATURATION: 98 % | DIASTOLIC BLOOD PRESSURE: 80 MMHG | SYSTOLIC BLOOD PRESSURE: 110 MMHG | BODY MASS INDEX: 34.52 KG/M2 | HEART RATE: 65 BPM

## 2025-05-09 DIAGNOSIS — E66.09 CLASS 1 OBESITY DUE TO EXCESS CALORIES WITH SERIOUS COMORBIDITY AND BODY MASS INDEX (BMI) OF 34.0 TO 34.9 IN ADULT: ICD-10-CM

## 2025-05-09 DIAGNOSIS — G47.33 OBSTRUCTIVE SLEEP APNEA OF ADULT: ICD-10-CM

## 2025-05-09 DIAGNOSIS — C92.11: Primary | ICD-10-CM

## 2025-05-09 DIAGNOSIS — E66.811 CLASS 1 OBESITY DUE TO EXCESS CALORIES WITH SERIOUS COMORBIDITY AND BODY MASS INDEX (BMI) OF 34.0 TO 34.9 IN ADULT: ICD-10-CM

## 2025-05-09 DIAGNOSIS — R73.9 HYPERGLYCEMIA: ICD-10-CM

## 2025-05-09 DIAGNOSIS — Z13.220 LIPID SCREENING: ICD-10-CM

## 2025-05-09 DIAGNOSIS — I10 HYPERTENSION, UNSPECIFIED TYPE: ICD-10-CM

## 2025-05-09 DIAGNOSIS — Z13.6 SCREENING FOR HEART DISEASE: ICD-10-CM

## 2025-05-09 DIAGNOSIS — M54.16 LUMBAR RADICULOPATHY: ICD-10-CM

## 2025-05-09 PROCEDURE — 3074F SYST BP LT 130 MM HG: CPT | Performed by: INTERNAL MEDICINE

## 2025-05-09 PROCEDURE — 3008F BODY MASS INDEX DOCD: CPT | Performed by: INTERNAL MEDICINE

## 2025-05-09 PROCEDURE — 3079F DIAST BP 80-89 MM HG: CPT | Performed by: INTERNAL MEDICINE

## 2025-05-09 PROCEDURE — 93000 ELECTROCARDIOGRAM COMPLETE: CPT | Performed by: INTERNAL MEDICINE

## 2025-05-09 PROCEDURE — 99214 OFFICE O/P EST MOD 30 MIN: CPT | Performed by: INTERNAL MEDICINE

## 2025-05-09 RX ORDER — GABAPENTIN 100 MG/1
100 CAPSULE ORAL NIGHTLY
Qty: 90 CAPSULE | Refills: 1 | Status: SHIPPED | OUTPATIENT
Start: 2025-05-09 | End: 2025-11-05

## 2025-05-09 ASSESSMENT — ENCOUNTER SYMPTOMS
OCCASIONAL FEELINGS OF UNSTEADINESS: 0
DEPRESSION: 0
LOSS OF SENSATION IN FEET: 0

## 2025-05-09 NOTE — PROGRESS NOTES
"Subjective   Patient ID: Duncan Ackerman is a 49 y.o. male who presents for New Patient Visit.    HPI patient presents to clinic to establish care.  He has been a former patient of .  He is doing well and denies any chest pain, cough congestion abdominal pain and shortness of breath.  EKG done shows sinus rhythm at  56 bpm, left axis deviation , normal intervals without any ischemic changes.    Past medical history significant for obesity,MEHDI,,BCR/ABL 1 positive CML,gerd,obesity,hyperglycemia,ef of 60%,colonic polyps,HTN,palpitation,sciatica,coronary calcium score of 0 in 2023,benign lung nodule..  Past surgical history notable for colonoscopy in 2022 showed colonic polyps and internal hemorrhoids and   Excisional hemorrhoidectomy on 9/27/24  Review of Systems   Constitutional: Negative.    HENT: Negative.     Eyes: Negative.    Respiratory: Negative.     Cardiovascular: Negative.    Gastrointestinal: Negative.    Endocrine: Negative.    Genitourinary: Negative.    Musculoskeletal: Negative.    Skin: Negative.    Allergic/Immunologic: Negative.    Neurological: Negative.    Hematological: Negative.    Psychiatric/Behavioral: Negative.         Objective fevers marker  /80 (BP Location: Left arm, Patient Position: Sitting)   Pulse 65   Ht 1.88 m (6' 2\")   Wt 122 kg (269 lb)   SpO2 98%   BMI 34.54 kg/m²     Physical Exam  Constitutional:       Appearance: Normal appearance. He is obese.   HENT:      Right Ear: Tympanic membrane normal.      Left Ear: Tympanic membrane and ear canal normal.      Nose: Nose normal.   Neck:      Vascular: No carotid bruit.   Cardiovascular:      Rate and Rhythm: Normal rate.   Pulmonary:      Effort: No respiratory distress.      Breath sounds: No stridor. No wheezing.   Abdominal:      Palpations: Abdomen is soft.      Tenderness: There is no abdominal tenderness. There is no guarding or rebound.   Skin:     Coloration: Skin is not jaundiced.      Findings: No " bruising.   Neurological:      General: No focal deficit present.      Mental Status: He is alert and oriented to person, place, and time.   Psychiatric:         Mood and Affect: Mood normal.         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       Lumbar radiculopathy  He is given trial gabapentin regarding lumbar radiculopathy.  Orders:    gabapentin (Neurontin) 100 mg capsule; Take 1 capsule (100 mg) by mouth once daily at bedtime.    Obstructive sleep apnea of adult  He is advised to continue CPAP regarding MEHDI.       Class 1 obesity due to excess calories with serious comorbidity and body mass index (BMI) of 34.0 to 34.9 in adult  He is advised to follow Mediterranean diet, exercise and lifestyle modification.       Hyperglycemia  We will monitor serum glucose and hemoglobin A1c.  Orders:    Hemoglobin A1c; Future    Lipid screening  Will monitor lipid panel  Orders:    Lipid Panel; Future    Hypertension, unspecified type  He is advised to continue metoprolol regarding history of hypertension.  He will be scheduled for repeat blood work and will return to clinic in 3 months for follow-up visit  Orders:    Basic metabolic panel; Future    Urinalysis with Reflex Microscopic; Future    ECG 12 lead (Clinic Performed)    Screening for heart disease  We will continue to monitor lipid panel and coronary artery calcium score

## 2025-05-11 ASSESSMENT — ENCOUNTER SYMPTOMS
GASTROINTESTINAL NEGATIVE: 1
RESPIRATORY NEGATIVE: 1
CONSTITUTIONAL NEGATIVE: 1
ENDOCRINE NEGATIVE: 1
ALLERGIC/IMMUNOLOGIC NEGATIVE: 1
HEMATOLOGIC/LYMPHATIC NEGATIVE: 1
EYES NEGATIVE: 1
PSYCHIATRIC NEGATIVE: 1
MUSCULOSKELETAL NEGATIVE: 1
NEUROLOGICAL NEGATIVE: 1
CARDIOVASCULAR NEGATIVE: 1

## 2025-05-11 NOTE — ASSESSMENT & PLAN NOTE
He is given trial gabapentin regarding lumbar radiculopathy.  Orders:    gabapentin (Neurontin) 100 mg capsule; Take 1 capsule (100 mg) by mouth once daily at bedtime.

## 2025-05-11 NOTE — ASSESSMENT & PLAN NOTE
Patient will continue Gleevec regarding history of CML and will continue to follow-up with oncology clinic.

## 2025-08-09 DIAGNOSIS — Z13.220 LIPID SCREENING: ICD-10-CM

## 2025-08-09 DIAGNOSIS — I10 HYPERTENSION, UNSPECIFIED TYPE: ICD-10-CM

## 2025-08-09 DIAGNOSIS — R73.9 HYPERGLYCEMIA: ICD-10-CM

## 2025-08-15 ENCOUNTER — APPOINTMENT (OUTPATIENT)
Dept: PRIMARY CARE | Facility: CLINIC | Age: 50
End: 2025-08-15
Payer: COMMERCIAL

## 2025-10-07 ENCOUNTER — APPOINTMENT (OUTPATIENT)
Dept: PRIMARY CARE | Facility: CLINIC | Age: 50
End: 2025-10-07
Payer: COMMERCIAL

## (undated) DEVICE — PREP TRAY, VAGINAL

## (undated) DEVICE — Device

## (undated) DEVICE — DRAPE, LAPAROTOMY II, PEDIATRIC

## (undated) DEVICE — PAD, POST, PERINEAL, ADULT

## (undated) DEVICE — SOLUTION, IRRIGATION, SODIUM CHLORIDE 0.9%, 1000 ML, POUR BOTTLE

## (undated) DEVICE — DRESSING, GAUZE, SUPER KERLIX, 6X6

## (undated) DEVICE — GLOVE, SURGICAL, BIOGEL, 7.5, PF, LATEX, GREEN

## (undated) DEVICE — BRIEF, PANTY, MESH, XL, 2PK

## (undated) DEVICE — SUTURE, SILK, 2-0, 18 IN, BR PS, BLACK

## (undated) DEVICE — SUTURE, VICRYL, 2-0, 27 IN, SH, UNDYED

## (undated) DEVICE — SOLUTION, IRRIGATION, STERILE WATER, 1000 ML, POUR BOTTLE